# Patient Record
Sex: FEMALE | Race: WHITE | Employment: FULL TIME | URBAN - NONMETROPOLITAN AREA
[De-identification: names, ages, dates, MRNs, and addresses within clinical notes are randomized per-mention and may not be internally consistent; named-entity substitution may affect disease eponyms.]

---

## 2020-02-19 ENCOUNTER — OFFICE VISIT (OUTPATIENT)
Dept: ENT CLINIC | Age: 60
End: 2020-02-19
Payer: COMMERCIAL

## 2020-02-19 VITALS
WEIGHT: 140.9 LBS | BODY MASS INDEX: 24.05 KG/M2 | DIASTOLIC BLOOD PRESSURE: 74 MMHG | RESPIRATION RATE: 16 BRPM | SYSTOLIC BLOOD PRESSURE: 108 MMHG | HEART RATE: 80 BPM | HEIGHT: 64 IN

## 2020-02-19 PROCEDURE — 99203 OFFICE O/P NEW LOW 30 MIN: CPT | Performed by: NURSE PRACTITIONER

## 2020-02-19 PROCEDURE — 3017F COLORECTAL CA SCREEN DOC REV: CPT | Performed by: NURSE PRACTITIONER

## 2020-02-19 PROCEDURE — G8484 FLU IMMUNIZE NO ADMIN: HCPCS | Performed by: NURSE PRACTITIONER

## 2020-02-19 PROCEDURE — G8420 CALC BMI NORM PARAMETERS: HCPCS | Performed by: NURSE PRACTITIONER

## 2020-02-19 PROCEDURE — 1036F TOBACCO NON-USER: CPT | Performed by: NURSE PRACTITIONER

## 2020-02-19 PROCEDURE — G8427 DOCREV CUR MEDS BY ELIG CLIN: HCPCS | Performed by: NURSE PRACTITIONER

## 2020-02-19 RX ORDER — FLUTICASONE PROPIONATE 50 MCG
1 SPRAY, SUSPENSION (ML) NASAL DAILY
COMMUNITY
End: 2020-02-28 | Stop reason: SDUPTHER

## 2020-02-19 NOTE — PROGRESS NOTES
impaired. Vitals:    02/19/20 1351   BP: 108/74   Pulse: 80   Resp: 16       Head is normocephalic, no obvious masses or lesions. JEAN-CLAUDE, EOM full. Conjunctivae pink, moist, no discharge. External ears are normal: no scars, lesions or masses. R External auditory canal clear and free of any pathology  L External auditory canal clear and free of any pathology   Tympanic membranes:  R intact, translucent                                            L intact, translucent    Nasal bones: intact  Septum:  mild deviation to left anteriorly  Mucosa:  congested  Turbinates: congested   Discharge:  yellow/green-culture taken    Lips, tongue and oral cavity show tongue is midline, mobile, no lesions. Dentition: good, no malocclusion  Oral mucosa: moist  Tonsils: absent  Oropharynx: normal-appearing mucosa  Hard and soft palates symmetrical and intact. Uvula midline. Gag reflex present  Nasopharynx: not seen    No facial redness, swelling or tenderness. Salivary glands not enlarged. Neck symmetrical, supple  Cervical adenopathy: no palpable lymphadenopathy  Trachea midline  Thyroid not enlarged, no palpable nodules or masses, no tenderness    Chest equal and symmetrical expansion, no retractions    Extremities: no clubbing, cyanosis or edema  Gait steady  Skin: normal exposed surfaces  Cranial nerves grossly intact    Data:  All of the past medical history, past surgical history, family history, social history, allergies and current medications were reviewed. Assessment & Plan:        1. Chronic ethmoidal sinusitis    2. Chronic maxillary sinusitis, left    3. Chronic sphenoidal sinusitis, left    4. Ear fullness, bilateral    5. Nasal congestion    6. Facial pressure    7. Post-nasal drainage    8. Purulent rhinorrhea    Sohail Briones is a 63 y.o. female with 4 month history of sinus symptoms and ear fullness despite medical therapies. CT sinus was completed after 1 month of Omnicef.   The patient

## 2020-02-24 ENCOUNTER — HOSPITAL ENCOUNTER (OUTPATIENT)
Dept: CT IMAGING | Age: 60
Discharge: HOME | End: 2020-02-24

## 2020-02-24 LAB
AEROBIC CULTURE: NORMAL
ANAEROBIC CULTURE: NORMAL
GRAM STAIN RESULT: NORMAL

## 2020-02-24 PROCEDURE — 3209999900 CT COMPARISON OF OUTSIDE FILMS

## 2020-02-26 ENCOUNTER — TELEPHONE (OUTPATIENT)
Dept: ENT CLINIC | Age: 60
End: 2020-02-26

## 2020-02-27 NOTE — TELEPHONE ENCOUNTER
Patient returned your call. Patient states she will be in a meeting but has her phone with her and should be able to answer. Please call.

## 2020-02-28 ENCOUNTER — OFFICE VISIT (OUTPATIENT)
Dept: ENT CLINIC | Age: 60
End: 2020-02-28
Payer: COMMERCIAL

## 2020-02-28 VITALS
SYSTOLIC BLOOD PRESSURE: 102 MMHG | WEIGHT: 142.5 LBS | DIASTOLIC BLOOD PRESSURE: 64 MMHG | HEART RATE: 60 BPM | HEIGHT: 64 IN | RESPIRATION RATE: 14 BRPM | BODY MASS INDEX: 24.33 KG/M2

## 2020-02-28 PROCEDURE — G8420 CALC BMI NORM PARAMETERS: HCPCS | Performed by: OTOLARYNGOLOGY

## 2020-02-28 PROCEDURE — 1036F TOBACCO NON-USER: CPT | Performed by: OTOLARYNGOLOGY

## 2020-02-28 PROCEDURE — 99213 OFFICE O/P EST LOW 20 MIN: CPT | Performed by: OTOLARYNGOLOGY

## 2020-02-28 PROCEDURE — G8427 DOCREV CUR MEDS BY ELIG CLIN: HCPCS | Performed by: OTOLARYNGOLOGY

## 2020-02-28 PROCEDURE — G8484 FLU IMMUNIZE NO ADMIN: HCPCS | Performed by: OTOLARYNGOLOGY

## 2020-02-28 PROCEDURE — 3017F COLORECTAL CA SCREEN DOC REV: CPT | Performed by: OTOLARYNGOLOGY

## 2020-02-28 RX ORDER — AMOXICILLIN AND CLAVULANATE POTASSIUM 875; 125 MG/1; MG/1
1 TABLET, FILM COATED ORAL 2 TIMES DAILY
Qty: 56 TABLET | Refills: 0 | Status: SHIPPED | OUTPATIENT
Start: 2020-02-28 | End: 2020-03-27

## 2020-02-28 RX ORDER — FLUTICASONE PROPIONATE 50 MCG
1 SPRAY, SUSPENSION (ML) NASAL DAILY
Qty: 1 BOTTLE | Refills: 3 | Status: ON HOLD | OUTPATIENT
Start: 2020-02-28 | End: 2020-07-08 | Stop reason: HOSPADM

## 2020-02-28 ASSESSMENT — ENCOUNTER SYMPTOMS
COLOR CHANGE: 0
CHOKING: 0
VOICE CHANGE: 0
FACIAL SWELLING: 0
SORE THROAT: 0
RHINORRHEA: 0
DIARRHEA: 0
SHORTNESS OF BREATH: 0
VOMITING: 0
NAUSEA: 0
COUGH: 0
APNEA: 0
ABDOMINAL PAIN: 0
STRIDOR: 0
CHEST TIGHTNESS: 0
TROUBLE SWALLOWING: 0
SINUS PRESSURE: 0
WHEEZING: 0

## 2020-02-28 NOTE — PROGRESS NOTES
[facility] PROFESSIONAL SERVS  [facility] EAR, NOSE AND THROAT  [facility]  Dept: [phone]  Dept Fax: [phone]  Loc: [phone]    Russel Sanchez is a 63 y.o. female who was referred byNo ref. provider found for:  Chief Complaint   Patient presents with    Pre-op Exam     Patient is here for pre-op sinus surgery   . HPI:     Mulu Banegas is a 63 y.o. female who presents today for evaluation of her persistent sinusitis. She has been having rather pronounced headaches from sinusitis documented by CT scan now for several months. She has had multiple antibiotics including an entire month of Cefdinir. She had a about a week of Augmentin which helped only transiently. Zithromax did not help at all. She has had prednisone and even a few days of Afrin as well. She has persistent rather pronounced midface pain. CT following the Cefdinir showed some sinusitis in all 8 sinuses with opacification of the ostiomeatal complexes bilaterally. There were infraorbital ethmoid cells contributing to this obstruction. Fortunately, this was not a 1 mm thin slice CT compatible with the stereotactic computerized image guidance, and came from a different hospital.    She also has idiopathic pulmonary fibrosis and is very concerned regarding whether this is a similar process in her sinuses. History: Allergies   Allergen Reactions    Bactrim [Sulfamethoxazole-Trimethoprim] Hives    Sulfa Antibiotics Hives     Current Outpatient Medications   Medication Sig Dispense Refill    Nintedanib Esylate 150 MG CAPS Take by mouth      fluticasone (FLONASE) 50 MCG/ACT nasal spray 1 spray by Each Nostril route daily       No current facility-administered medications for this visit.       Past Medical History:   Diagnosis Date    Chronic idiopathic pulmonary fibrosis ([facility] [address]       Past Surgical History:   Procedure Laterality Date    TONSILLECTOMY AND ADENOIDECTOMY       History

## 2020-03-02 LAB
AEROBIC CULTURE: ABNORMAL
AEROBIC CULTURE: ABNORMAL
GRAM STAIN RESULT: ABNORMAL
ORGANISM: ABNORMAL

## 2020-03-06 ENCOUNTER — TELEPHONE (OUTPATIENT)
Dept: ENT CLINIC | Age: 60
End: 2020-03-06

## 2020-03-06 NOTE — TELEPHONE ENCOUNTER
The culture grew strep pneumoniae. The Augmentin should work just fine for that type of bacteria. Continue the current regimen.

## 2020-03-06 NOTE — TELEPHONE ENCOUNTER
Patient called to get the results of the culture that was taken sent to her Pulmonologist.  His name is Dr. Jed Conteh from Pulmonary and [address] at [facility]. The fax number is [phone]. She stated she was also given the results as well. I faxed the results to her Pulmonologist.  She also stated that she wanted to have all her visit notes sent to him as she has several issues. She stated that at her last visit when she checked in they said this doctor was not in the system and would have to check with the Manager. Rae Reno know and she will look into it as well.

## 2020-03-30 ENCOUNTER — HOSPITAL ENCOUNTER (OUTPATIENT)
Dept: CT IMAGING | Age: 60
Discharge: HOME | End: 2020-03-30
Payer: COMMERCIAL

## 2020-03-30 PROCEDURE — 70486 CT MAXILLOFACIAL W/O DYE: CPT

## 2020-04-06 ENCOUNTER — TELEPHONE (OUTPATIENT)
Dept: ENT CLINIC | Age: 60
End: 2020-04-06

## 2020-04-06 RX ORDER — AMOXICILLIN AND CLAVULANATE POTASSIUM 875; 125 MG/1; MG/1
1 TABLET, FILM COATED ORAL 2 TIMES DAILY
Qty: 28 TABLET | Refills: 0 | Status: SHIPPED | OUTPATIENT
Start: 2020-04-06 | End: 2020-04-20

## 2020-04-06 NOTE — TELEPHONE ENCOUNTER
I spoke with Dr. Jamse Lesch discussing the results. He states that she will likely need sinus surgery in the future. He agrees that there is fluid in her ear. He recommended another course of antibiotics since her left ear has improved. I called the patient to discuss. I explained that she does have persistent sinus disease and would likely benefit from surgery. I explained that given the circumstances of the COVID-19 outbreak, we are unable to schedule elective procedures at this time. She expressed understanding. We then discussed her ear. She says her left ear has improved, but her right one has actually worsened. She states that her ear feels full with muffled hearing and intermittent stabbing pains. I explained Dr. Jamse Lesch recommendation of an additional course of antibiotics. She states that she was on a month of Omnicef prior to seeing [place] and then another month of Augmentin after seeing Dr. Jamse Lesch shortly after that. I explained that her pain may mean an infection, especially if her daughter saw \"mustard yellow\" appearance of the TM which the antibiotic should help. I explained that we will do 14 days of Augmentin and have her follow up with Dr. Jamse Lesch in about 21 days for ear exam under microscope. If the fluid persists, DR. Jamse Lesch will likely place a tube in the office that day. The patient expressed understanding of the plan and thanked me. She will contact the office with new or worsening symptoms in the meantime. Please call the patient and schedule her for about a 3 week follow up with Dr. Jamse Lesch.

## 2020-04-28 ENCOUNTER — OFFICE VISIT (OUTPATIENT)
Dept: ENT CLINIC | Age: 60
End: 2020-04-28
Payer: COMMERCIAL

## 2020-04-28 VITALS
SYSTOLIC BLOOD PRESSURE: 114 MMHG | TEMPERATURE: 98.2 F | HEIGHT: 64 IN | WEIGHT: 134.6 LBS | HEART RATE: 80 BPM | RESPIRATION RATE: 12 BRPM | BODY MASS INDEX: 22.98 KG/M2 | DIASTOLIC BLOOD PRESSURE: 72 MMHG

## 2020-04-28 DIAGNOSIS — J32.2 CHRONIC ETHMOIDAL SINUSITIS: ICD-10-CM

## 2020-04-28 PROBLEM — J32.0 CHRONIC MAXILLARY SINUSITIS: Status: ACTIVE | Noted: 2020-04-28

## 2020-04-28 PROBLEM — J32.1 CHRONIC FRONTAL SINUSITIS: Status: ACTIVE | Noted: 2020-04-28

## 2020-04-28 PROBLEM — J32.3 CHRONIC SPHENOIDAL SINUSITIS: Status: ACTIVE | Noted: 2020-04-28

## 2020-04-28 PROCEDURE — G8427 DOCREV CUR MEDS BY ELIG CLIN: HCPCS | Performed by: OTOLARYNGOLOGY

## 2020-04-28 PROCEDURE — 3017F COLORECTAL CA SCREEN DOC REV: CPT | Performed by: OTOLARYNGOLOGY

## 2020-04-28 PROCEDURE — 69433 CREATE EARDRUM OPENING: CPT | Performed by: OTOLARYNGOLOGY

## 2020-04-28 PROCEDURE — 1036F TOBACCO NON-USER: CPT | Performed by: OTOLARYNGOLOGY

## 2020-04-28 PROCEDURE — G8420 CALC BMI NORM PARAMETERS: HCPCS | Performed by: OTOLARYNGOLOGY

## 2020-04-28 PROCEDURE — 99214 OFFICE O/P EST MOD 30 MIN: CPT | Performed by: OTOLARYNGOLOGY

## 2020-04-28 ASSESSMENT — ENCOUNTER SYMPTOMS
COLOR CHANGE: 0
NAUSEA: 0
SHORTNESS OF BREATH: 0
TROUBLE SWALLOWING: 0
DIARRHEA: 0
CHOKING: 0
RHINORRHEA: 0
VOICE CHANGE: 0
STRIDOR: 0
SINUS PRESSURE: 1
COUGH: 0
VOMITING: 0
SORE THROAT: 0
WHEEZING: 0
SINUS PAIN: 0
CHEST TIGHTNESS: 0
ABDOMINAL PAIN: 0
FACIAL SWELLING: 0
APNEA: 0

## 2020-04-28 NOTE — PROGRESS NOTES
[facility] PROFESSIONAL SERVS  [facility] EAR, NOSE AND THROAT  [facility]  Dept: [phone]  Dept Fax: [phone]  Loc: [phone]    Geovany Sanchez is a 63 y.o. female who was referred byNo ref. provider found for:  Chief Complaint   Patient presents with    Follow-up     Patient is here for a follow up after CT sinus 3/30/2020 c/o sinus pressure, sinus drainage and ear fullness    . HPI:     Filiberto Banks is a 63 y.o. female who presents today for follow-up on her CT results after month of vigorous therapy for her sinusitis on Augmentin 875 twice daily sinus irrigations. Her ears have gotten worse. She is now plugged up in both ears. CT scan showed effusion in the right middle ear with partial opacification of the mastoids. She also had significant mucosal disease in all 8 sinuses. Septum is relatively straight turbinates did not seem hypertrophied and she is not complaining of nasal obstruction. She is complaining of pressure in her face that has persisted now for months. Prior culture grew Streptococcus pneumonia, quite sensitive, and yet she did not improve. .    Reviewing her CT and normal imaging, in view of her sinus symptoms, she also requests we proceed with surgical intervention. She complains of severe difficulty hearing. History: Allergies   Allergen Reactions    Bactrim [Sulfamethoxazole-Trimethoprim] Hives    Sulfa Antibiotics Hives     Current Outpatient Medications   Medication Sig Dispense Refill    fluticasone (FLONASE) 50 MCG/ACT nasal spray 1 spray by Nasal route daily 1 Bottle 3    Nintedanib Esylate 150 MG CAPS Take by mouth       No current facility-administered medications for this visit. Past Medical History:   Diagnosis Date    Chronic idiopathic pulmonary fibrosis ([facility] [address]       Past Surgical History:   Procedure Laterality Date    TONSILLECTOMY AND ADENOIDECTOMY       History reviewed.  No pertinent family and atraumatic. No laceration. Comments:        Right Ear: Ear canal and external ear normal. No drainage or swelling. A middle ear effusion is present. Tympanic membrane is not perforated or erythematous. Left Ear: Ear canal and external ear normal. No drainage or swelling. A middle ear effusion is present. Tympanic membrane is not perforated or erythematous. Nose: Nose normal. No septal deviation, mucosal edema or rhinorrhea. Mouth/Throat:      Mouth: Mucous membranes are not pale and not dry. No oral lesions. Pharynx: Oropharynx is clear. Uvula midline. No oropharyngeal exudate or posterior oropharyngeal erythema. Comments: LIps: lips normal     Mallampati 1  Base of tongue: symmetric,  Eyes:      Extraocular Movements:      Right eye: Normal extraocular motion and no nystagmus. Left eye: Normal extraocular motion and no nystagmus. Comments: Conjugate gaze   Neck:      Musculoskeletal: Neck supple. Thyroid: No thyroid mass or thyromegaly. Trachea: Phonation normal. No tracheal deviation. Comments:   Salivary glands not enlarged and normal to palpation    Pulmonary:      Effort: Pulmonary effort is normal. No retractions. Breath sounds: No stridor. Neurological:      Mental Status: She is alert and oriented to person, place, and time. Cranial Nerves: No cranial nerve deficit (VIIth N function intact bilat). Psychiatric:         Mood and Affect: Mood and affect normal.         Behavior: Behavior is cooperative. Data:  All of the past medical history, past surgical history, family history,social history, allergies and current medications were reviewed with the patient. Assessment & Plan   Diagnoses and all orders for this visit:     Diagnosis Orders   1. Chronic frontal sinusitis  IGS Endo Sinus Sx    OR NASAL/SINUS NDSC W/RMVL TISS FROM FRONTAL SINUS   2.  Chronic ethmoidal sinusitis  IGS Endo Sinus Sx    OR NASAL/SINUS NDSC W/TOTAL ETHOIDECTOMY    Culture, Nasal   3. Chronic maxillary sinusitis, left  IGS Endo Sinus Sx    IA NASAL SCOPY,OPEN MAXILL SINUS   4. Chronic sphenoidal sinusitis, left  IGS Endo Sinus Sx    IA NASAL SCOPY,SPHENOIDOTOMY   5. Bilateral chronic serous otitis media  IA CREATE EARDRUM OPENING,LOCAL ANESTH       The findings were explained and her questions were answered. Her otitis actually got worse on culture directed antibiotic therapy for over a month. She requests tube placements. Myringotomy and tympanostomy tube placement, bilateral    After an adequate level of topical anesthesia of the posterior inferior quadrant of each tympanic membrane had been achieved with phenol, alcohol was instilled and suctioned dry. A radial incision was made in the posterior inferior quadrant. Middle ear fluid was suctioned. Fischer ventilation tube was placed without difficulty. The patient's hearing improved immediately. The opposite ear was treated in a similar fashion with tube placement. The patient tolerated the procedure well. [place] had a slightly yellowish serous effusion. Recommended surgical procedures are:   Bilateral endoscopic intranasal frontal sinusotomies  Bilateral complete endoscopic ethmoidectomies  Bilateral endoscopic maxillary antrostomies  Bilateral endoscopic sphenoidostomies  Stereotactic computerized image guidance for sinus surgery  Time estimate:  3 hours    INFORMED CONSENT:   Using the CT scan, the planned procedures were explained in detail. The risks and benefits of these sinus procedures, including but not limited to risk of anesthesia, bleeding, infection, vision loss and /or eye muscle damage, CSF leak, epiphora (eye watering), potential need for further surgery and possible persistent sinus infections were discussed with the patient.  The risks and benefits of alternative procedures, as well as the possible consequences of not undergoing the procedures were discussed, if

## 2020-04-30 LAB — THROAT/NOSE CULTURE: NORMAL

## 2020-05-07 ENCOUNTER — TELEPHONE (OUTPATIENT)
Dept: ENT CLINIC | Age: 60
End: 2020-05-07

## 2020-05-07 ENCOUNTER — PATIENT MESSAGE (OUTPATIENT)
Dept: ENT CLINIC | Age: 60
End: 2020-05-07

## 2020-06-09 ENCOUNTER — OFFICE VISIT (OUTPATIENT)
Dept: ENT CLINIC | Age: 60
End: 2020-06-09
Payer: COMMERCIAL

## 2020-06-09 VITALS
SYSTOLIC BLOOD PRESSURE: 114 MMHG | DIASTOLIC BLOOD PRESSURE: 68 MMHG | RESPIRATION RATE: 14 BRPM | HEART RATE: 62 BPM | BODY MASS INDEX: 22.14 KG/M2 | WEIGHT: 129 LBS | TEMPERATURE: 97.3 F

## 2020-06-09 PROBLEM — D86.0 SARCOIDOSIS OF LUNG (HCC): Status: ACTIVE | Noted: 2020-06-09

## 2020-06-09 PROCEDURE — G8420 CALC BMI NORM PARAMETERS: HCPCS | Performed by: OTOLARYNGOLOGY

## 2020-06-09 PROCEDURE — 99214 OFFICE O/P EST MOD 30 MIN: CPT | Performed by: OTOLARYNGOLOGY

## 2020-06-09 PROCEDURE — 3017F COLORECTAL CA SCREEN DOC REV: CPT | Performed by: OTOLARYNGOLOGY

## 2020-06-09 PROCEDURE — G8427 DOCREV CUR MEDS BY ELIG CLIN: HCPCS | Performed by: OTOLARYNGOLOGY

## 2020-06-09 PROCEDURE — 1036F TOBACCO NON-USER: CPT | Performed by: OTOLARYNGOLOGY

## 2020-06-09 ASSESSMENT — ENCOUNTER SYMPTOMS
VOICE CHANGE: 0
VOMITING: 0
SHORTNESS OF BREATH: 0
APNEA: 0
STRIDOR: 0
NAUSEA: 0
TROUBLE SWALLOWING: 0
ABDOMINAL PAIN: 0
DIARRHEA: 0
FACIAL SWELLING: 0
CHEST TIGHTNESS: 0
CHOKING: 0
COLOR CHANGE: 0
RHINORRHEA: 0
SINUS PRESSURE: 1
COUGH: 0
WHEEZING: 0
SORE THROAT: 0

## 2020-06-09 NOTE — LETTER
explained in detail. The risks and benefits of these sinus procedures, including but not limited to risk of anesthesia, bleeding, infection, vision loss and /or eye muscle damage, CSF leak, epiphora (eye watering), potential need for further surgery and possible persistent sinus infections were discussed with the patient. The risks and benefits of alternative procedures, as well as the possible consequences of not undergoing the procedures were discussed, if applicable. They read and kept the information sheet with postop instructions, which lists the more common and even some of the more unusual complications, and the sheet listing the types of medications to avoid that could interfere with clotting. All of their questions were answered and they understood no guarantees were made. The patient verbalized understanding and gave consent to proceed. They also specifically consent to any additional related procedure, if the indications and need become evident during the surgery. We will check with Dr Kalyan Padilla and the patient. Last note from Dr Kalyan Padilla says to continue the med and suggested a second opinion. There is no question about the medical indications for the surgery:      Most recent scan following a month of vigorous therapy for sinusitis. Final decision for surgery is still pending clearance from Dr Kalyan Padilla      If you have questions, please do not hesitate to call me. I look forward to following Carleen Mireles along with you.     Sincerely,          García Guardado MD

## 2020-06-09 NOTE — PROGRESS NOTES
[facility] PROFESSIONAL SERVS  [facility] EAR, NOSE AND THROAT  [address]  Dept: [phone]  Dept Fax: [phone]  Loc: [phone]    Ida Sanchez is a 63 y.o. female who was referred byNo ref. provider found for:  Chief Complaint   Patient presents with    Pre-op Exam     Here for pre-op IGS/sinus 7/8/2020. 3 week tube check rescheduled drom 5/192020 due to COVID-19. Lauree Grade HPI:     Yeyo Waterman is a 63 y.o. female who presents today for potential preop visit regarding her sinuses. We reviewed her CT results after a month of vigorous therapy for her sinusitis on Augmentin 875 twice daily sinus irrigations. Her ears have gotten worse. She is now plugged up in both ears. CT scan showed effusion in the right middle ear with partial opacification of the mastoids. She also had significant mucosal disease in all 8 sinuses. Septum is relatively straight turbinates did not seem hypertrophied and she is not complaining of nasal obstruction. She is complaining of pressure in her face that has persisted now for months. Prior culture grew Streptococcus pneumonia, quite sensitive, and yet she did not improve. .. History: Allergies   Allergen Reactions    Bactrim [Sulfamethoxazole-Trimethoprim] Hives    Sulfa Antibiotics Hives     Current Outpatient Medications   Medication Sig Dispense Refill    fluticasone (FLONASE) 50 MCG/ACT nasal spray 1 spray by Nasal route daily 1 Bottle 3    Nintedanib Esylate 150 MG CAPS Take by mouth       No current facility-administered medications for this visit. Past Medical History:   Diagnosis Date    Chronic idiopathic pulmonary fibrosis ([facility] [address]       Past Surgical History:   Procedure Laterality Date    TONSILLECTOMY AND ADENOIDECTOMY       History reviewed. No pertinent family history.   Social History     Tobacco Use    Smoking status: Never Smoker    Smokeless tobacco: Never Used   Substance Use Topics    tube is present. Tympanic membrane is not perforated or erythematous. Left Ear: Hearing, tympanic membrane, ear canal and external ear normal. No drainage or swelling. No middle ear effusion. A PE tube is present. Tympanic membrane is not perforated or erythematous. Nose: Mucosal edema and rhinorrhea present. No septal deviation. Mouth/Throat:      Mouth: Mucous membranes are not pale and not dry. No oral lesions. Pharynx: Oropharynx is clear. Uvula midline. No oropharyngeal exudate or posterior oropharyngeal erythema. Comments: LIps: lips normal     Mallampati 1  Base of tongue: symmetric,  Eyes:      Extraocular Movements:      Right eye: Normal extraocular motion and no nystagmus. Left eye: Normal extraocular motion and no nystagmus. Comments: Conjugate gaze   Neck:      Musculoskeletal: Neck supple. Thyroid: No thyroid mass or thyromegaly. Trachea: Phonation normal. No tracheal deviation. Comments:   Salivary glands not enlarged and normal to palpation    Pulmonary:      Effort: Pulmonary effort is normal. No retractions. Breath sounds: No stridor. Neurological:      Mental Status: She is alert and oriented to person, place, and time. Cranial Nerves: No cranial nerve deficit (VIIth N function intact bilat). Psychiatric:         Mood and Affect: Mood and affect normal.         Behavior: Behavior is cooperative. Data:  All of the past medical history, past surgical history, family history,social history, allergies and current medications were reviewed with the patient. Assessment & Plan   Diagnoses and all orders for this visit:     Diagnosis Orders   1. Chronic frontal sinusitis     2. Chronic ethmoidal sinusitis     3. Chronic maxillary sinusitis, left     4. Chronic sphenoidal sinusitis, left     5.  Sarcoidosis of lung ([facility] [address]     6. Status post myringotomy with tube placement of both ears         The findings were explained and her questions were answered. Recommended surgical procedures are:   Bilateral endoscopic intranasal frontal sinusotomies  Bilateral complete endoscopic ethmoidectomies  Bilateral endoscopic maxillary antrostomies  Bilateral endoscopic sphenoidostomies  Stereotactic computerized image guidance for sinus surgery  Time estimate: 3 hours        The patient still needs pulmonary clearance. Have not received anything in terms of whether to stop the medication she is on for healing purposes, since it blocks blocks fibroblast function. INFORMED CONSENT:   Using the CT scan, the planned procedures were explained in detail. The risks and benefits of these sinus procedures, including but not limited to risk of anesthesia, bleeding, infection, vision loss and /or eye muscle damage, CSF leak, epiphora (eye watering), potential need for further surgery and possible persistent sinus infections were discussed with the patient. The risks and benefits of alternative procedures, as well as the possible consequences of not undergoing the procedures were discussed, if applicable. They read and kept the information sheet with postop instructions, which lists the more common and even some of the more unusual complications, and the sheet listing the types of medications to avoid that could interfere with clotting. All of their questions were answered and they understood no guarantees were made. The patient verbalized understanding and gave consent to proceed. They also specifically consent to any additional related procedure, if the indications and need become evident during the surgery. We will check with Dr Fabienne Driscoll and the patient. Last note from Dr Fabienne Driscoll says to continue the med and suggested a second opinion. There is no question about the medical indications for the surgery:        Most recent scan following a month of vigorous therapy for sinusitis.     Final decision for surgery is still pending clearance from Dr Fabienne Driscoll Dilshad Rawls. Maurice Turner MD    **This report has been created using voice recognition software. It may contain minor errors which are inherent in voicerecognition technology. **

## 2020-06-10 ENCOUNTER — TELEPHONE (OUTPATIENT)
Dept: ENT CLINIC | Age: 60
End: 2020-06-10

## 2020-06-10 NOTE — TELEPHONE ENCOUNTER
Patient called and asked to have her COVID-19 and CBC pre op testing orders faxed to [facility] Scheduling so she can be scheduled to get the testing before surgery. I have faxed the orders to [phone].

## 2020-06-30 NOTE — TELEPHONE ENCOUNTER
The patient called the office wandering about pre op clearance. The patient has an appointment this afternoon with her family doctor, Jonh Corona CNP. The patient would like to know why she needed preop clearance from her pulmonologist rather than her family doctor. The patient stated that her pulmonologist in [facility] cleared her for surgery and sent the paper to our office. I did not see that form. The patient was wondering if she could receive clearance from her family doctor and if we could fax any necessary paperwork; she stated that the appointment today with her family doctor felt unnecessary.

## 2020-07-02 ENCOUNTER — TELEPHONE (OUTPATIENT)
Dept: ENT CLINIC | Age: 60
End: 2020-07-02

## 2020-07-02 RX ORDER — UBIDECARENONE 30 MG
1 CAPSULE ORAL DAILY
COMMUNITY

## 2020-07-02 RX ORDER — UBIDECARENONE 50 MG
1 CAPSULE ORAL DAILY
COMMUNITY

## 2020-07-02 RX ORDER — PHENOL 1.4 %
1 AEROSOL, SPRAY (ML) MUCOUS MEMBRANE DAILY
COMMUNITY

## 2020-07-02 RX ORDER — ASCORBIC ACID 500 MG
500 TABLET ORAL DAILY
COMMUNITY

## 2020-07-02 NOTE — PROGRESS NOTES
Following instructions given to patient, who states understanding:    NPO after midnight  [employer] and 's license  Wear comfortable clean clothing  Do not bring jewelry   Shower night before and morning of surgery with a liquid antibacterial soap  Bring medications in original bottles  Follow all instructions given by your physician   needed at discharge  Call [phone] for any questions  Report to [facility] on 2nd floor  If you would become ill prior to surgery, please call the surgeon  May have only 1 visitor accompany you for surgery  Please bring and wear mask    Covid screening questionnaire complete and negative for symptoms or exposure see chart for documentation    Patient will get covid test at [facility] on Mon am 7/6/20.

## 2020-07-02 NOTE — TELEPHONE ENCOUNTER
I spoke with Mariah at Dr. Aj Soto office to verify if patient could stop her Nintedanib Esylate 150 mg before surgery (IGS Sinus Surgery) and if it would interfere with the healing process. Per [employer] after checking with the pulmonologist patient can continue medication prior to surgery and it will not interfere with the healing process.

## 2020-07-02 NOTE — PROGRESS NOTES
In preparation for their surgical procedure above patient was screened for Obstructive Sleep Apnea (ANALIA) using the STOP-Bang Questionnaire by the Pre-Admission Testing department. This is a pre-surgical screening tool for patient safety and serves as a recommendation, this WILL NOT cause cancellation of surgery. STOP-Bang Questionnaire  * Do you currently see a pulmonologist?  No     If yes STOP, do not complete. Patient follows with Dr.     1.  Do you snore loudly (able to be heard in the next room)? No    2. Do you often feel tired or sleepy during the daytime? No       3. Has anyone ever told you that you stop breathing during your sleep? No    4. Do you have or are you being treated for high blood pressure? No      5. BMI more than 35? BMI (Calculated): 22.2        No    6. Age over 48 years? 63 y.o. Yes    7. Neck Circumference greater than 17 inches for male or 16 inches for female? Measured           (visits only)            Not Applicable    8. Gender Male? No      TOTAL SCORE: 1    ANALIA - Low Risk : Yes to 0 - 2 questions  ANALIA - Intermediate Risk : Yes to 3 - 4 questions  ANALIA - High Risk : Yes to 5 - 8 questions    Adapted from:   STOP Questionnaire: A Tool to Screen Patients for Obstructive Sleep Apnea   DEL Slade.PBridgetteC., Medardo Baer M.B.B.S., Matty Babcock M.D., Sanket Stein. Monie Ellis, Ph.D., Karyna Rose M.B.B.S., Heriberto Vargas M.Sc., Berta Hollis M.D., Hank Bowman. DEL Koo[employer]    Anesthesiology 2008; 589:365-10 Copyright 2008, the [address] of [employer]est[employer], stephan 37.   ----------------------------------------------------------------------------------------------------------------

## 2020-07-07 NOTE — H&P
SRPX [facility] PROFESSIONAL SERVS  [facility]'S EAR, NOSE AND THROAT  [facility]  Dept: [phone]  Dept Fax: [phone]  Loc: [phone]     Ny Sanchez is a 63 y.o. female who was referred byNo ref. provider found for:       Chief Complaint   Patient presents with    Pre-op Exam       Here for pre-op IGS/sinus 7/8/2020. 3 week tube check rescheduled drom 5/192020 due to COVID-19. .     HPI:      Meghan Ordonez is a 63 y.o. female who presents today for potential preop visit regarding her sinuses. We reviewed her CT results after a month of vigorous therapy for her sinusitis on Augmentin 875 twice daily sinus irrigations.  Her ears have gotten worse.  She is now plugged up in both ears.  CT scan showed effusion in the right middle ear with partial opacification of the mastoids.  She also had significant mucosal disease in all 8 sinuses.  Septum is relatively straight turbinates did not seem hypertrophied and she is not complaining of nasal obstruction.  She is complaining of pressure in her face that has persisted now for months.  Prior culture grew Streptococcus pneumonia, quite sensitive, and yet she did not improve. ..     History:      Allergies   Allergen Reactions    Bactrim [Sulfamethoxazole-Trimethoprim] Hives    Sulfa Antibiotics Hives      Current Facility-Administered Medications          Current Outpatient Medications   Medication Sig Dispense Refill    fluticasone (FLONASE) 50 MCG/ACT nasal spray 1 spray by Nasal route daily 1 Bottle 3    Nintedanib Esylate 150 MG CAPS Take by mouth          No current facility-administered medications for this visit.          Past Medical History        Past Medical History:   Diagnosis Date    Chronic idiopathic pulmonary fibrosis (HCC)           Past Surgical History         Past Surgical History:   Procedure Laterality Date    TONSILLECTOMY AND ADENOIDECTOMY             Family History   History reviewed.  No pertinent family history. Social History           Tobacco Use    Smoking status: Never Smoker    Smokeless tobacco: Never Used   Substance Use Topics    Alcohol use: Not on file         Subjective:      Review of Systems   Constitutional: Negative for activity change, appetite change, chills, diaphoresis, fatigue, fever and unexpected weight change. HENT: Positive for congestion, postnasal drip and sinus pressure. Negative for dental problem, ear discharge, ear pain, facial swelling, hearing loss, mouth sores, nosebleeds, rhinorrhea, sneezing, sore throat, tinnitus, trouble swallowing and voice change. Eyes: Negative for visual disturbance. Respiratory: Negative for apnea, cough, choking, chest tightness, shortness of breath, wheezing and stridor. Cardiovascular: Negative for chest pain, palpitations and leg swelling. Gastrointestinal: Negative for abdominal pain, diarrhea, nausea and vomiting. Endocrine: Negative for cold intolerance, heat intolerance, polydipsia and polyuria. Genitourinary: Negative for dysuria, enuresis and hematuria. Musculoskeletal: Negative for arthralgias, gait problem, neck pain and neck stiffness. Skin: Negative for color change and rash. Allergic/Immunologic: Negative for environmental allergies, food allergies and immunocompromised state. Neurological: Negative for dizziness, syncope, facial asymmetry, speech difficulty, light-headedness and headaches. Hematological: Negative for adenopathy. Does not bruise/bleed easily. Psychiatric/Behavioral: Negative for confusion and sleep disturbance. The patient is not nervous/anxious.          Objective:   /68 (Site: Right Upper Arm, Position: Sitting)   Pulse 62   Temp 97.3 °F (36.3 °C)   Resp 14   Wt 129 lb (58.5 kg)   BMI 22.14 kg/m²      Physical Exam  Vitals signs and nursing note reviewed. Constitutional:       Appearance: She is well-developed. HENT:      Head: Normocephalic and atraumatic. No laceration. Comments:        Right Ear: Hearing, tympanic membrane, ear canal and external ear normal. No drainage or swelling. No middle ear effusion. A PE tube is present. Tympanic membrane is not perforated or erythematous. Left Ear: Hearing, tympanic membrane, ear canal and external ear normal. No drainage or swelling. No middle ear effusion. A PE tube is present. Tympanic membrane is not perforated or erythematous. Nose: Mucosal edema and rhinorrhea present. No septal deviation. Mouth/Throat:      Mouth: Mucous membranes are not pale and not dry. No oral lesions. Pharynx: Oropharynx is clear. Uvula midline. No oropharyngeal exudate or posterior oropharyngeal erythema. Comments: LIps: lips normal     Mallampati 1  Base of tongue: symmetric,  Eyes:      Extraocular Movements:      Right eye: Normal extraocular motion and no nystagmus. Left eye: Normal extraocular motion and no nystagmus. Comments: Conjugate gaze   Neck:      Musculoskeletal: Neck supple. Thyroid: No thyroid mass or thyromegaly. Trachea: Phonation normal. No tracheal deviation. Comments:   Salivary glands not enlarged and normal to palpation    Pulmonary:      Effort: Pulmonary effort is normal. No retractions. Breath sounds: No stridor. Neurological:      Mental Status: She is alert and oriented to person, place, and time. Cranial Nerves: No cranial nerve deficit (VIIth N function intact bilat). Psychiatric:         Mood and Affect: Mood and affect normal.         Behavior: Behavior is cooperative.            Data:  All of the past medical history, past surgical history, family history,social history, allergies and current medications were reviewed with the patient. Assessment & Plan   Diagnoses and all orders for this visit:       Diagnosis Orders   1. Chronic frontal sinusitis      2. Chronic ethmoidal sinusitis      3. Chronic maxillary sinusitis, left      4.  Chronic opinion.     There is no question about the medical indications for the surgery:          Most recent scan following a month of vigorous therapy for sinusitis.      Final decision for surgery had not been made at the time of this visit. We heard later from Dr. Eyad Weaver, pulmonology, that it was okay to proceed, and the medication would not affect healing, as documented in the chart and telephone notes. Since no septoplasty was involved, we will proceed. I also checked with the hospital pharmacy, and they could find no contraindications or warnings associated with that medication regarding surgical interventions. Luis Alberto Art. Beba Caldwell MD     **This report has been created using voice recognition software. It may contain minor errors which are inherent in voicerecognition technology. **

## 2020-07-07 NOTE — PROGRESS NOTES
Patient contacted regarding COVID-19 screen. Following questions asked: In the last month, have you been in contact with someone who was confirmed or suspected to have Coronavirus/COVID-19:  Patient stated NO    Do you or family members have any of the following symptoms:  Cough-no   Muscle pain-no   Shortness of breath-no   Fever-no   Weakness-no  Severe headache-no   Sore throat-no   Respiratory symptoms-no    Have you traveled internationally in the last month?  No     Have you been to the emergency room recently-no     Pt had covid test at [facility] 7/6/20

## 2020-07-08 ENCOUNTER — ANESTHESIA (OUTPATIENT)
Dept: OPERATING ROOM | Age: 60
End: 2020-07-08
Payer: COMMERCIAL

## 2020-07-08 ENCOUNTER — ANESTHESIA EVENT (OUTPATIENT)
Dept: OPERATING ROOM | Age: 60
End: 2020-07-08
Payer: COMMERCIAL

## 2020-07-08 ENCOUNTER — HOSPITAL ENCOUNTER (OUTPATIENT)
Age: 60
Setting detail: OUTPATIENT SURGERY
Discharge: HOME | End: 2020-07-08
Attending: OTOLARYNGOLOGY | Admitting: OTOLARYNGOLOGY
Payer: COMMERCIAL

## 2020-07-08 VITALS — DIASTOLIC BLOOD PRESSURE: 66 MMHG | TEMPERATURE: 99.3 F | OXYGEN SATURATION: 100 % | SYSTOLIC BLOOD PRESSURE: 111 MMHG

## 2020-07-08 VITALS
DIASTOLIC BLOOD PRESSURE: 80 MMHG | SYSTOLIC BLOOD PRESSURE: 147 MMHG | OXYGEN SATURATION: 98 % | BODY MASS INDEX: 22.26 KG/M2 | TEMPERATURE: 98.6 F | RESPIRATION RATE: 16 BRPM | HEART RATE: 106 BPM | WEIGHT: 130.4 LBS | HEIGHT: 64 IN

## 2020-07-08 LAB
EKG ATRIAL RATE: 74 BPM
EKG P AXIS: 64 DEGREES
EKG P-R INTERVAL: 180 MS
EKG Q-T INTERVAL: 424 MS
EKG QRS DURATION: 80 MS
EKG QTC CALCULATION (BAZETT): 470 MS
EKG R AXIS: 62 DEGREES
EKG T AXIS: 59 DEGREES
EKG VENTRICULAR RATE: 74 BPM

## 2020-07-08 PROCEDURE — C2625 STENT, NON-COR, TEM W/DEL SY: HCPCS | Performed by: OTOLARYNGOLOGY

## 2020-07-08 PROCEDURE — 88305 TISSUE EXAM BY PATHOLOGIST: CPT

## 2020-07-08 PROCEDURE — 31253 NSL/SINS NDSC TOTAL: CPT | Performed by: OTOLARYNGOLOGY

## 2020-07-08 PROCEDURE — 61782 SCAN PROC CRANIAL EXTRA: CPT | Performed by: OTOLARYNGOLOGY

## 2020-07-08 PROCEDURE — 31287 NASAL/SINUS ENDOSCOPY SURG: CPT | Performed by: OTOLARYNGOLOGY

## 2020-07-08 PROCEDURE — 2500000003 HC RX 250 WO HCPCS: Performed by: REGISTERED NURSE

## 2020-07-08 PROCEDURE — 6370000000 HC RX 637 (ALT 250 FOR IP): Performed by: OTOLARYNGOLOGY

## 2020-07-08 PROCEDURE — 2780000010 HC IMPLANT OTHER: Performed by: OTOLARYNGOLOGY

## 2020-07-08 PROCEDURE — 87070 CULTURE OTHR SPECIMN AEROBIC: CPT

## 2020-07-08 PROCEDURE — 93005 ELECTROCARDIOGRAM TRACING: CPT | Performed by: OTOLARYNGOLOGY

## 2020-07-08 PROCEDURE — 3700000001 HC ADD 15 MINUTES (ANESTHESIA): Performed by: OTOLARYNGOLOGY

## 2020-07-08 PROCEDURE — 87147 CULTURE TYPE IMMUNOLOGIC: CPT

## 2020-07-08 PROCEDURE — 6370000000 HC RX 637 (ALT 250 FOR IP): Performed by: REGISTERED NURSE

## 2020-07-08 PROCEDURE — 2580000003 HC RX 258: Performed by: REGISTERED NURSE

## 2020-07-08 PROCEDURE — 87075 CULTR BACTERIA EXCEPT BLOOD: CPT

## 2020-07-08 PROCEDURE — 2580000003 HC RX 258: Performed by: OTOLARYNGOLOGY

## 2020-07-08 PROCEDURE — 87205 SMEAR GRAM STAIN: CPT

## 2020-07-08 PROCEDURE — 31267 ENDOSCOPY MAXILLARY SINUS: CPT | Performed by: OTOLARYNGOLOGY

## 2020-07-08 PROCEDURE — 2709999900 HC NON-CHARGEABLE SUPPLY: Performed by: OTOLARYNGOLOGY

## 2020-07-08 PROCEDURE — 7100000011 HC PHASE II RECOVERY - ADDTL 15 MIN: Performed by: OTOLARYNGOLOGY

## 2020-07-08 PROCEDURE — 7100000001 HC PACU RECOVERY - ADDTL 15 MIN: Performed by: OTOLARYNGOLOGY

## 2020-07-08 PROCEDURE — 2720000010 HC SURG SUPPLY STERILE: Performed by: OTOLARYNGOLOGY

## 2020-07-08 PROCEDURE — 6360000002 HC RX W HCPCS: Performed by: OTOLARYNGOLOGY

## 2020-07-08 PROCEDURE — 7100000000 HC PACU RECOVERY - FIRST 15 MIN: Performed by: OTOLARYNGOLOGY

## 2020-07-08 PROCEDURE — 3600000014 HC SURGERY LEVEL 4 ADDTL 15MIN: Performed by: OTOLARYNGOLOGY

## 2020-07-08 PROCEDURE — 7100000010 HC PHASE II RECOVERY - FIRST 15 MIN: Performed by: OTOLARYNGOLOGY

## 2020-07-08 PROCEDURE — 6360000002 HC RX W HCPCS: Performed by: REGISTERED NURSE

## 2020-07-08 PROCEDURE — 3600000004 HC SURGERY LEVEL 4 BASE: Performed by: OTOLARYNGOLOGY

## 2020-07-08 PROCEDURE — 3700000000 HC ANESTHESIA ATTENDED CARE: Performed by: OTOLARYNGOLOGY

## 2020-07-08 PROCEDURE — 2500000003 HC RX 250 WO HCPCS: Performed by: OTOLARYNGOLOGY

## 2020-07-08 DEVICE — PROPEL MINI SINUS IMPLANT
Type: IMPLANTABLE DEVICE | Site: NOSE | Status: FUNCTIONAL
Brand: PROPEL MINI

## 2020-07-08 DEVICE — AGENT HEMOSTATIC SURGIFLOW MATRIX KIT W/THROMBIN: Type: IMPLANTABLE DEVICE | Site: NOSE | Status: FUNCTIONAL

## 2020-07-08 RX ORDER — FENTANYL CITRATE 50 UG/ML
INJECTION, SOLUTION INTRAMUSCULAR; INTRAVENOUS PRN
Status: DISCONTINUED | OUTPATIENT
Start: 2020-07-08 | End: 2020-07-08 | Stop reason: SDUPTHER

## 2020-07-08 RX ORDER — SODIUM CHLORIDE 9 MG/ML
INJECTION, SOLUTION INTRAVENOUS CONTINUOUS
Status: DISCONTINUED | OUTPATIENT
Start: 2020-07-08 | End: 2020-07-08 | Stop reason: HOSPADM

## 2020-07-08 RX ORDER — HYDROCODONE BITARTRATE AND ACETAMINOPHEN 7.5; 325 MG/1; MG/1
1 TABLET ORAL ONCE
Status: COMPLETED | OUTPATIENT
Start: 2020-07-08 | End: 2020-07-08

## 2020-07-08 RX ORDER — SODIUM CHLORIDE 9 MG/ML
INJECTION, SOLUTION INTRAVENOUS CONTINUOUS PRN
Status: DISCONTINUED | OUTPATIENT
Start: 2020-07-08 | End: 2020-07-08 | Stop reason: SDUPTHER

## 2020-07-08 RX ORDER — GLYCOPYRROLATE 1 MG/5 ML
SYRINGE (ML) INTRAVENOUS PRN
Status: DISCONTINUED | OUTPATIENT
Start: 2020-07-08 | End: 2020-07-08 | Stop reason: SDUPTHER

## 2020-07-08 RX ORDER — FENTANYL CITRATE 50 UG/ML
50 INJECTION, SOLUTION INTRAMUSCULAR; INTRAVENOUS EVERY 5 MIN PRN
Status: DISCONTINUED | OUTPATIENT
Start: 2020-07-08 | End: 2020-07-08 | Stop reason: HOSPADM

## 2020-07-08 RX ORDER — PREDNISONE 20 MG/1
20 TABLET ORAL DAILY
Qty: 4 TABLET | Refills: 0 | Status: SHIPPED | OUTPATIENT
Start: 2020-07-08 | End: 2020-07-11

## 2020-07-08 RX ORDER — FENTANYL CITRATE 50 UG/ML
25 INJECTION, SOLUTION INTRAMUSCULAR; INTRAVENOUS EVERY 5 MIN PRN
Status: DISCONTINUED | OUTPATIENT
Start: 2020-07-08 | End: 2020-07-08 | Stop reason: HOSPADM

## 2020-07-08 RX ORDER — NEOSTIGMINE METHYLSULFATE 5 MG/5 ML
SYRINGE (ML) INTRAVENOUS PRN
Status: DISCONTINUED | OUTPATIENT
Start: 2020-07-08 | End: 2020-07-08 | Stop reason: SDUPTHER

## 2020-07-08 RX ORDER — MEPERIDINE HYDROCHLORIDE 25 MG/ML
12.5 INJECTION INTRAMUSCULAR; INTRAVENOUS; SUBCUTANEOUS EVERY 5 MIN PRN
Status: DISCONTINUED | OUTPATIENT
Start: 2020-07-08 | End: 2020-07-08 | Stop reason: HOSPADM

## 2020-07-08 RX ORDER — DEXAMETHASONE SODIUM PHOSPHATE 4 MG/ML
INJECTION, SOLUTION INTRA-ARTICULAR; INTRALESIONAL; INTRAMUSCULAR; INTRAVENOUS; SOFT TISSUE PRN
Status: DISCONTINUED | OUTPATIENT
Start: 2020-07-08 | End: 2020-07-08 | Stop reason: SDUPTHER

## 2020-07-08 RX ORDER — SUCCINYLCHOLINE/SOD CL,ISO/PF 200MG/10ML
SYRINGE (ML) INTRAVENOUS PRN
Status: DISCONTINUED | OUTPATIENT
Start: 2020-07-08 | End: 2020-07-08 | Stop reason: SDUPTHER

## 2020-07-08 RX ORDER — HYDROCODONE BITARTRATE AND ACETAMINOPHEN 7.5; 325 MG/1; MG/1
1 TABLET ORAL EVERY 6 HOURS PRN
Qty: 20 TABLET | Refills: 0 | Status: SHIPPED | OUTPATIENT
Start: 2020-07-08 | End: 2020-07-13

## 2020-07-08 RX ORDER — EPHEDRINE SULFATE/0.9% NACL/PF 50 MG/5 ML
SYRINGE (ML) INTRAVENOUS PRN
Status: DISCONTINUED | OUTPATIENT
Start: 2020-07-08 | End: 2020-07-08 | Stop reason: SDUPTHER

## 2020-07-08 RX ORDER — DEXAMETHASONE SODIUM PHOSPHATE 4 MG/ML
INJECTION, SOLUTION INTRA-ARTICULAR; INTRALESIONAL; INTRAMUSCULAR; INTRAVENOUS; SOFT TISSUE PRN
Status: DISCONTINUED | OUTPATIENT
Start: 2020-07-08 | End: 2020-07-08 | Stop reason: ALTCHOICE

## 2020-07-08 RX ORDER — OXYMETAZOLINE HYDROCHLORIDE 0.05 G/100ML
SPRAY NASAL PRN
Status: DISCONTINUED | OUTPATIENT
Start: 2020-07-08 | End: 2020-07-08 | Stop reason: ALTCHOICE

## 2020-07-08 RX ORDER — ONDANSETRON 2 MG/ML
4 INJECTION INTRAMUSCULAR; INTRAVENOUS
Status: DISCONTINUED | OUTPATIENT
Start: 2020-07-08 | End: 2020-07-08 | Stop reason: HOSPADM

## 2020-07-08 RX ORDER — LABETALOL 20 MG/4 ML (5 MG/ML) INTRAVENOUS SYRINGE
5 EVERY 10 MIN PRN
Status: DISCONTINUED | OUTPATIENT
Start: 2020-07-08 | End: 2020-07-08 | Stop reason: HOSPADM

## 2020-07-08 RX ORDER — LIDOCAINE HYDROCHLORIDE 20 MG/ML
INJECTION, SOLUTION INTRAVENOUS PRN
Status: DISCONTINUED | OUTPATIENT
Start: 2020-07-08 | End: 2020-07-08 | Stop reason: SDUPTHER

## 2020-07-08 RX ORDER — DEXAMETHASONE SODIUM PHOSPHATE 4 MG/ML
12 INJECTION, SOLUTION INTRA-ARTICULAR; INTRALESIONAL; INTRAMUSCULAR; INTRAVENOUS; SOFT TISSUE
Status: DISCONTINUED | OUTPATIENT
Start: 2020-07-08 | End: 2020-07-08 | Stop reason: HOSPADM

## 2020-07-08 RX ORDER — GINSENG 100 MG
CAPSULE ORAL PRN
Status: DISCONTINUED | OUTPATIENT
Start: 2020-07-08 | End: 2020-07-08 | Stop reason: ALTCHOICE

## 2020-07-08 RX ORDER — ONDANSETRON 2 MG/ML
INJECTION INTRAMUSCULAR; INTRAVENOUS PRN
Status: DISCONTINUED | OUTPATIENT
Start: 2020-07-08 | End: 2020-07-08 | Stop reason: SDUPTHER

## 2020-07-08 RX ORDER — ROPIVACAINE HYDROCHLORIDE 5 MG/ML
INJECTION, SOLUTION EPIDURAL; INFILTRATION; PERINEURAL PRN
Status: DISCONTINUED | OUTPATIENT
Start: 2020-07-08 | End: 2020-07-08 | Stop reason: ALTCHOICE

## 2020-07-08 RX ORDER — LIDOCAINE HYDROCHLORIDE 40 MG/ML
SOLUTION TOPICAL PRN
Status: DISCONTINUED | OUTPATIENT
Start: 2020-07-08 | End: 2020-07-08 | Stop reason: SDUPTHER

## 2020-07-08 RX ORDER — PROMETHAZINE HYDROCHLORIDE 25 MG/ML
6.25 INJECTION, SOLUTION INTRAMUSCULAR; INTRAVENOUS
Status: DISCONTINUED | OUTPATIENT
Start: 2020-07-08 | End: 2020-07-08 | Stop reason: HOSPADM

## 2020-07-08 RX ORDER — PROPOFOL 10 MG/ML
INJECTION, EMULSION INTRAVENOUS PRN
Status: DISCONTINUED | OUTPATIENT
Start: 2020-07-08 | End: 2020-07-08 | Stop reason: SDUPTHER

## 2020-07-08 RX ORDER — LIDOCAINE HYDROCHLORIDE AND EPINEPHRINE 10; 10 MG/ML; UG/ML
INJECTION, SOLUTION INFILTRATION; PERINEURAL PRN
Status: DISCONTINUED | OUTPATIENT
Start: 2020-07-08 | End: 2020-07-08 | Stop reason: ALTCHOICE

## 2020-07-08 RX ORDER — ROCURONIUM BROMIDE 10 MG/ML
INJECTION, SOLUTION INTRAVENOUS PRN
Status: DISCONTINUED | OUTPATIENT
Start: 2020-07-08 | End: 2020-07-08 | Stop reason: SDUPTHER

## 2020-07-08 RX ADMIN — ONDANSETRON HYDROCHLORIDE 4 MG: 4 INJECTION, SOLUTION INTRAMUSCULAR; INTRAVENOUS at 07:43

## 2020-07-08 RX ADMIN — LIDOCAINE HYDROCHLORIDE 4 ML: 40 SOLUTION TOPICAL at 07:43

## 2020-07-08 RX ADMIN — ROCURONIUM BROMIDE 10 MG: 10 INJECTION INTRAVENOUS at 10:28

## 2020-07-08 RX ADMIN — FENTANYL CITRATE 100 MCG: 50 INJECTION, SOLUTION INTRAMUSCULAR; INTRAVENOUS at 07:43

## 2020-07-08 RX ADMIN — ROCURONIUM BROMIDE 30 MG: 10 INJECTION INTRAVENOUS at 07:55

## 2020-07-08 RX ADMIN — Medication 0.6 MG: at 11:40

## 2020-07-08 RX ADMIN — Medication 120 MG: at 07:43

## 2020-07-08 RX ADMIN — SODIUM CHLORIDE: 9 INJECTION, SOLUTION INTRAVENOUS at 07:35

## 2020-07-08 RX ADMIN — LIDOCAINE HYDROCHLORIDE 50 MG: 20 INJECTION, SOLUTION INTRAVENOUS at 07:43

## 2020-07-08 RX ADMIN — DEXAMETHASONE SODIUM PHOSPHATE 12 MG: 4 INJECTION, SOLUTION INTRA-ARTICULAR; INTRALESIONAL; INTRAMUSCULAR; INTRAVENOUS; SOFT TISSUE at 07:17

## 2020-07-08 RX ADMIN — HYDROCODONE BITARTRATE AND ACETAMINOPHEN 1 TABLET: 7.5; 325 TABLET ORAL at 13:49

## 2020-07-08 RX ADMIN — Medication 20 MG: at 07:54

## 2020-07-08 RX ADMIN — DEXAMETHASONE SODIUM PHOSPHATE 10 MG: 4 INJECTION, SOLUTION INTRAMUSCULAR; INTRAVENOUS at 07:43

## 2020-07-08 RX ADMIN — PROPOFOL 200 MG: 10 INJECTION, EMULSION INTRAVENOUS at 07:43

## 2020-07-08 RX ADMIN — Medication 3 MG: at 11:40

## 2020-07-08 RX ADMIN — Medication 20 MG: at 07:46

## 2020-07-08 RX ADMIN — SODIUM CHLORIDE: 9 INJECTION, SOLUTION INTRAVENOUS at 07:16

## 2020-07-08 RX ADMIN — FENTANYL CITRATE 50 MCG: 50 INJECTION, SOLUTION INTRAMUSCULAR; INTRAVENOUS at 10:27

## 2020-07-08 RX ADMIN — SODIUM CHLORIDE: 9 INJECTION, SOLUTION INTRAVENOUS at 09:00

## 2020-07-08 RX ADMIN — FENTANYL CITRATE 50 MCG: 50 INJECTION, SOLUTION INTRAMUSCULAR; INTRAVENOUS at 09:54

## 2020-07-08 ASSESSMENT — PULMONARY FUNCTION TESTS
PIF_VALUE: 17
PIF_VALUE: 18
PIF_VALUE: 17
PIF_VALUE: 18
PIF_VALUE: 17
PIF_VALUE: 18
PIF_VALUE: 15
PIF_VALUE: 17
PIF_VALUE: 18
PIF_VALUE: 17
PIF_VALUE: 17
PIF_VALUE: 18
PIF_VALUE: 19
PIF_VALUE: 18
PIF_VALUE: 17
PIF_VALUE: 18
PIF_VALUE: 17
PIF_VALUE: 18
PIF_VALUE: 18
PIF_VALUE: 17
PIF_VALUE: 18
PIF_VALUE: 17
PIF_VALUE: 18
PIF_VALUE: 17
PIF_VALUE: 17
PIF_VALUE: 18
PIF_VALUE: 17
PIF_VALUE: 18
PIF_VALUE: 17
PIF_VALUE: 18
PIF_VALUE: 18
PIF_VALUE: 17
PIF_VALUE: 17
PIF_VALUE: 18
PIF_VALUE: 17
PIF_VALUE: 18
PIF_VALUE: 17
PIF_VALUE: 1
PIF_VALUE: 18
PIF_VALUE: 18
PIF_VALUE: 17
PIF_VALUE: 15
PIF_VALUE: 2
PIF_VALUE: 18
PIF_VALUE: 18
PIF_VALUE: 16
PIF_VALUE: 18
PIF_VALUE: 17
PIF_VALUE: 17
PIF_VALUE: 18
PIF_VALUE: 18
PIF_VALUE: 17
PIF_VALUE: 18
PIF_VALUE: 21
PIF_VALUE: 17
PIF_VALUE: 18
PIF_VALUE: 17
PIF_VALUE: 2
PIF_VALUE: 18
PIF_VALUE: 17
PIF_VALUE: 17
PIF_VALUE: 18
PIF_VALUE: 17
PIF_VALUE: 18
PIF_VALUE: 17
PIF_VALUE: 0
PIF_VALUE: 17
PIF_VALUE: 18
PIF_VALUE: 16
PIF_VALUE: 3
PIF_VALUE: 18
PIF_VALUE: 18
PIF_VALUE: 17
PIF_VALUE: 18
PIF_VALUE: 17
PIF_VALUE: 18
PIF_VALUE: 17
PIF_VALUE: 18
PIF_VALUE: 17
PIF_VALUE: 18
PIF_VALUE: 17
PIF_VALUE: 2
PIF_VALUE: 17
PIF_VALUE: 18
PIF_VALUE: 15
PIF_VALUE: 16
PIF_VALUE: 18
PIF_VALUE: 17
PIF_VALUE: 18
PIF_VALUE: 17
PIF_VALUE: 17
PIF_VALUE: 18
PIF_VALUE: 17
PIF_VALUE: 7
PIF_VALUE: 17
PIF_VALUE: 0
PIF_VALUE: 18
PIF_VALUE: 17
PIF_VALUE: 18
PIF_VALUE: 18
PIF_VALUE: 17
PIF_VALUE: 18
PIF_VALUE: 17
PIF_VALUE: 18
PIF_VALUE: 17
PIF_VALUE: 18
PIF_VALUE: 17
PIF_VALUE: 18
PIF_VALUE: 17
PIF_VALUE: 18
PIF_VALUE: 17
PIF_VALUE: 18
PIF_VALUE: 17
PIF_VALUE: 17
PIF_VALUE: 18
PIF_VALUE: 17
PIF_VALUE: 18
PIF_VALUE: 17
PIF_VALUE: 17
PIF_VALUE: 18
PIF_VALUE: 17
PIF_VALUE: 16
PIF_VALUE: 17
PIF_VALUE: 18
PIF_VALUE: 17
PIF_VALUE: 18
PIF_VALUE: 17
PIF_VALUE: 2
PIF_VALUE: 17
PIF_VALUE: 18
PIF_VALUE: 3
PIF_VALUE: 18
PIF_VALUE: 15
PIF_VALUE: 18
PIF_VALUE: 17
PIF_VALUE: 18
PIF_VALUE: 18
PIF_VALUE: 17
PIF_VALUE: 18
PIF_VALUE: 17
PIF_VALUE: 17
PIF_VALUE: 16
PIF_VALUE: 18
PIF_VALUE: 17
PIF_VALUE: 1
PIF_VALUE: 18
PIF_VALUE: 18
PIF_VALUE: 17
PIF_VALUE: 17
PIF_VALUE: 18
PIF_VALUE: 18
PIF_VALUE: 17
PIF_VALUE: 18
PIF_VALUE: 18
PIF_VALUE: 17
PIF_VALUE: 18
PIF_VALUE: 17
PIF_VALUE: 15
PIF_VALUE: 17
PIF_VALUE: 18
PIF_VALUE: 17
PIF_VALUE: 16
PIF_VALUE: 18
PIF_VALUE: 18
PIF_VALUE: 2
PIF_VALUE: 17
PIF_VALUE: 18
PIF_VALUE: 17
PIF_VALUE: 1
PIF_VALUE: 17
PIF_VALUE: 17
PIF_VALUE: 18
PIF_VALUE: 18
PIF_VALUE: 17
PIF_VALUE: 19
PIF_VALUE: 18
PIF_VALUE: 18
PIF_VALUE: 17
PIF_VALUE: 18
PIF_VALUE: 18
PIF_VALUE: 17
PIF_VALUE: 17
PIF_VALUE: 38
PIF_VALUE: 17
PIF_VALUE: 4

## 2020-07-08 ASSESSMENT — PAIN - FUNCTIONAL ASSESSMENT: PAIN_FUNCTIONAL_ASSESSMENT: 0-10

## 2020-07-08 ASSESSMENT — PAIN SCALES - GENERAL
PAINLEVEL_OUTOF10: 5
PAINLEVEL_OUTOF10: 5
PAINLEVEL_OUTOF10: 0
PAINLEVEL_OUTOF10: 4
PAINLEVEL_OUTOF10: 3

## 2020-07-08 ASSESSMENT — PAIN DESCRIPTION - FREQUENCY
FREQUENCY: CONTINUOUS
FREQUENCY: CONTINUOUS

## 2020-07-08 ASSESSMENT — PAIN DESCRIPTION - LOCATION
LOCATION: THROAT
LOCATION: THROAT;NOSE

## 2020-07-08 ASSESSMENT — PAIN DESCRIPTION - DESCRIPTORS
DESCRIPTORS: BURNING
DESCRIPTORS: BURNING;CONSTANT

## 2020-07-08 ASSESSMENT — PAIN DESCRIPTION - PAIN TYPE
TYPE: SURGICAL PAIN
TYPE: SURGICAL PAIN

## 2020-07-08 NOTE — PROGRESS NOTES
Visualized back of pt throat, no drainage noted. Left side at back of throat is reddened. Pt states throat pain is on the right and into right ear.

## 2020-07-08 NOTE — ANESTHESIA POSTPROCEDURE EVALUATION
Department of Anesthesiology  Postprocedure Note    Patient: Omaira Zuluaga  MRN: [id number]  YOB: 1960  Date of evaluation: 7/8/2020  Time:  2:29 PM     Procedure Summary     Date:  07/08/20 Room / Location:  [address]    Anesthesia Start:  5143 Anesthesia Stop:  1157    Procedure:  IGS SINUS MAXILLARY ANTROSTOMY, FRONTAL SINUSOTOMY WITH REMOVAL OF TISSUE, ANTERIOR AND POSTERIOR ETHMOIDECTOMY, SPHENOIDECTOMY-ALL BILATERAL (Bilateral Nose) Diagnosis:  (CHRONIC SINUSITIS-FRONTAL, ETHMOID, MAXILLARY, SPHENOID)    Surgeon:  Felicia Christopher MD Responsible Provider:  Brenda Segal MD    Anesthesia Type:  general ASA Status:  2          Anesthesia Type: general    Spike Phase I: Spike Score: 9    Spike Phase II:      Last vitals: Reviewed and per EMR flowsheets. Anesthesia Post Evaluation    Patient location during evaluation: PACU  Patient participation: complete - patient participated  Level of consciousness: awake and alert  Airway patency: patent  Nausea & Vomiting: no nausea and no vomiting  Complications: no  Cardiovascular status: hemodynamically stable  Respiratory status: acceptable  Hydration status: euvolemic      [facility]  POST-ANESTHESIA NOTE       Name:  Omaira Zuluaga                                         Age:  63 y.o.   MRN:  [id number]      Last Vitals:  /79   Pulse 101   Temp 98.6 °F (37 °C) (Temporal)   Resp 16   Ht 5' 3.5\" (1.613 m)   Wt 130 lb 6.4 oz (59.1 kg)   SpO2 97%   BMI 22.74 kg/m²   Patient Vitals for the past 4 hrs:   BP Temp Temp src Pulse Resp SpO2   07/08/20 1321 133/79 -- -- 101 16 97 %   07/08/20 1240 127/71 98.6 °F (37 °C) Temporal 102 16 96 %   07/08/20 1225 122/69 -- -- 98 14 96 %   07/08/20 1220 126/68 -- -- 101 15 97 %   07/08/20 1215 126/66 -- -- 106 12 96 %   07/08/20 1210 127/66 -- -- 109 14 95 %   07/08/20 1205 127/64 -- -- 115 12 95 %   07/08/20 1200 129/64 -- -- 120 10 95 %   07/08/20 1155 118/64 -- -- 124 10 96 % 07/08/20 1152 -- 100.2 °F (37.9 °C) Temporal 123 12 96 %       Level of Consciousness:  Awake    Respiratory:  Stable    Oxygen Saturation:  Stable    Cardiovascular:  Stable    Hydration:  Adequate    PONV:  Stable    Post-op Pain:  Adequate analgesia    Post-op Assessment:  No apparent anesthetic complications    Additional Follow-Up / Treatment / Comment:  None    Toño Manning MD  July 8, 2020   2:29 PM

## 2020-07-08 NOTE — BRIEF OP NOTE
Brief Postoperative Note      Patient: Kamini Sanchez  YOB: 1960  MRN: [id number]    Date of Procedure: 7/8/2020    Pre-Op Diagnosis: CHRONIC SINUSITIS-FRONTAL, ETHMOID, MAXILLARY, SPHENOID    Post-Op Diagnosis: Same       Procedure(s):  IGS SINUS MAXILLARY ANTROSTOMY, FRONTAL SINUSOTOMY,  ANTERIOR AND POSTERIOR ETHMOIDECTOMY, SPHENOIDOTOMY-ALL BILATERAL    Surgeon(s):  Tino Harris MD    Assistant:  Surgical Assistant: Mike Oh    Anesthesia: General    Estimated Blood Loss (mL): less than 928     Complications: None    Specimens:   ID Type Source Tests Collected by Time Destination   1 : lt ethmoid Swab Sinus GRAM STAIN, CULTURE, AEROBIC Tino Harris MD 7/8/2020 0848    2 : lt sphenoid Swab Sinus CULTURE, ANAEROBIC AND AEROBIC Tino Harris MD 7/8/2020 0935    A : sinus contents Tissue Sinus SURGICAL PATHOLOGY Tino Harris MD 7/8/2020 1046        Implants:  Implant Name Type Inv. Item Serial No.  Lot No. LRB No. Used Action   KIT SEALANT SURGIFLO HEMOSTATIC MATRIX Bone/Graft/Tissue/Human/Synth KIT SEALANT SURGIFLO HEMOSTATIC MATRIX  [place]: [address] [facility] [id number] N/A 1 Implanted   IMPL SINUS STEROID RELEASE PROPEL MINI Face/Chin/Dental/Voice IMPL SINUS STEROID RELEASE PROPEL MINI  INTERSECT ENT [id number] N/A 1 Implanted   IMPL SINUS STEROID RELEASE PROPEL MINI Face/Chin/Dental/Voice IMPL SINUS STEROID RELEASE PROPEL MINI  INTERSECT ENT [id number] N/A 1 Implanted         Drains:   Urethral Catheter 16 fr (Active)       Findings: dFFUSE MUCOSAL SWELLING, ALL SINUSES HAD THICK MUCOID CREAMY OPAQUE PUS; BILATERAL MINI-PROPEL STENTS IN FRONTAL RECESSES.     Electronically signed by Lexie De Souza MD on 7/8/2020 at 12:03 PM

## 2020-07-08 NOTE — PROGRESS NOTES
Pt and family oriented to SDS 6 and unit. Pt verbalized approval for first name, last initial and physician name on unit whiteboard. Fall band applied. Vaccine information given. Plan of care reviewed.

## 2020-07-08 NOTE — PROGRESS NOTES
1152- Patient to PACU. Hooked up to monitor. Report from DONNA Deras and LUIS CARLOS Jenkins. Patient arouses to name. 1200- Dr. Dalila Fuller at bedside. 1205- Patient more awake and alert. Denies pain and nausea. Vitals stable. States she feels hot, temp 100.2, applied fan for comfort. 1214- Patient states she is starting to feel some burning pain, tolerable. Denies nausea, tolerating ice chips. Vitals stable. 1226- Asleep, easy to arouse. States pain unchanged and tolerable, denies nausea. Vitals stable. Meets PACU discharge criteria. Transported to [facility] room 6 by RN in stable condition. Report to [facility] LUIS CARLOS, Javed Wilson. Family present in [facility].

## 2020-07-09 ENCOUNTER — OFFICE VISIT (OUTPATIENT)
Dept: ENT CLINIC | Age: 60
End: 2020-07-09
Payer: COMMERCIAL

## 2020-07-09 VITALS
TEMPERATURE: 98.1 F | BODY MASS INDEX: 22.53 KG/M2 | HEIGHT: 64 IN | RESPIRATION RATE: 14 BRPM | DIASTOLIC BLOOD PRESSURE: 70 MMHG | SYSTOLIC BLOOD PRESSURE: 122 MMHG | HEART RATE: 80 BPM | WEIGHT: 132 LBS

## 2020-07-09 PROCEDURE — G8427 DOCREV CUR MEDS BY ELIG CLIN: HCPCS | Performed by: OTOLARYNGOLOGY

## 2020-07-09 PROCEDURE — 3017F COLORECTAL CA SCREEN DOC REV: CPT | Performed by: OTOLARYNGOLOGY

## 2020-07-09 PROCEDURE — 1036F TOBACCO NON-USER: CPT | Performed by: OTOLARYNGOLOGY

## 2020-07-09 PROCEDURE — G8420 CALC BMI NORM PARAMETERS: HCPCS | Performed by: OTOLARYNGOLOGY

## 2020-07-09 PROCEDURE — 99213 OFFICE O/P EST LOW 20 MIN: CPT | Performed by: OTOLARYNGOLOGY

## 2020-07-09 RX ORDER — ESTRADIOL 0.1 MG/G
CREAM VAGINAL
COMMUNITY
Start: 2020-04-15

## 2020-07-09 RX ORDER — BETAMETHASONE DIPROPIONATE 0.5 MG/G
LOTION TOPICAL
COMMUNITY
Start: 2020-05-26

## 2020-07-09 RX ORDER — OMEPRAZOLE 20 MG/1
CAPSULE, DELAYED RELEASE ORAL
COMMUNITY
Start: 2020-07-06

## 2020-07-09 ASSESSMENT — ENCOUNTER SYMPTOMS
CHEST TIGHTNESS: 0
COLOR CHANGE: 0
VOMITING: 0
CHOKING: 0
SHORTNESS OF BREATH: 0
SINUS PRESSURE: 0
DIARRHEA: 0
RHINORRHEA: 0
COUGH: 0
TROUBLE SWALLOWING: 0
VOICE CHANGE: 0
SORE THROAT: 0
ABDOMINAL PAIN: 0
STRIDOR: 0
WHEEZING: 0
APNEA: 0
FACIAL SWELLING: 0
NAUSEA: 0

## 2020-07-09 NOTE — OP NOTE
[address] [place] [address]                                OPERATIVE REPORT    PATIENT NAME: LATOSHA MARCOS                        :        1960  MED REC NO:   [id number]                           ROOM:  ACCOUNT NO:   [de-identified]                           ADMIT DATE: 2020  PROVIDER:     Kathrin Silva. Ita Morales M.D.    Leeann Marsist:  2020    PREOPERATIVE DIAGNOSES:  Chronic frontal, ethmoid, maxillary, and  sphenoid sinusitis. Sarcoidosis. POSTOPERATIVE DIAGNOSES:  Chronic frontal, ethmoid, maxillary, and  sphenoid sinusitis. Sarcoidosis. OPERATIONS:  Bilateral intranasal endoscopic frontal sinusotomy,  bilateral complete endoscopic ethmoidectomies, bilateral maxillary  antrostomies, bilateral endoscopic sphenoidostomies, and stereotactic  computerized image guidance for sinus surgery. SURGEON:  Esau Ball MD    ANESTHESIA:  General endotracheal.    HISTORY AND OPERATIVE FINDINGS:  This is a 27-year-old female with  sarcoid in her lungs who has had chronic sinusitis now for an  exceptionally long time. This did not get better on antibiotics,  irrigations, topical steroid sprays. She has been on a  fibroblast-inhibitor that was stopped a month ago and will be restarted  once she is basically healed up. Findings at surgery revealed diffuse mucosal inflammation and  thickening. There was soft watery edema throughout. All the sinus  pathways were obstructed with this and the anatomic variations. This  obstruction was documented preoperatively on the CT. Cultures were  taken from a couple of the sinuses, especially the left sphenoid, which  will be checked for both aerobic and anaerobic bacteria with Gram stain. The frontals, maxillaries, and sphenoid sinuses had long stringy pus  suctioned from them. Ethmoids were opacified bilaterally. Estimated  blood loss was less than 100 mL.   Vasoconstriction sphenoethmoidal recess which was terribly swollen  and occluded was dissected with the microdebrider, and then, sphenoid  sinus ostium was palpated. The sphenoid sinus ostium was then enlarged  in the superior and lateral direction with the microdebrider and bipolar  cautery was used along the cut edges of the sphenoidostomy as well as  the superior turbinate. The cottonoids impregnated with Afrin were then placed in the operative  sites in the left side and attention was turned to the right side. Analogous procedures were performed. The right uncinate process was incised and removed and the natural  ostium was identified. This was decompressed, and a, \"Craig\"  nasoantral window was thus created. There was pus present and that was  suctioned. The right frontal recess was then dissected using curved tracking probe,  and with suction and microdebrider blade. The opening into the frontal  sinus was identified and opened and then cottonoid impregnated with  Afrin was placed in that space. Right complete endoscopic ethmoidectomy was then performed using  microdebrider and cutting forceps as needed. Meticulous dissection was  carried out and the peripheral mucosal preservation was excellent. Superior meatus was checked for patency. The lower third of the right  superior turbinate was resected and the edge cauterized with bipolar  cautery. The sphenoethmoidal recess was very obstructed with soft tissue swelling  and a very small ostium. This was palpated, opened, and then a  sphenoidostomy created that was approximately 6 to 7 mm or more in  diameter. Cottonoids impregnated with Afrin were placed in that space. With all of the goals achieved, attention was then turned to hemostasis  and completing the procedure.   The ropivacaine neurovascular bundle  blocks with epinephrine were redone in both sides involving the  sphenopalatine and anterior ethmoid areas and also area of

## 2020-07-09 NOTE — PROGRESS NOTES
[facility] PROFESSIONAL SERVS  [facility]'S EAR, NOSE AND THROAT  [facility]  Dept: [phone]  Dept Fax: [phone]  Loc: [phone]    Lyndsay Sanchez is a 63 y.o. female who was referred byNo ref. provider found for:  Chief Complaint   Patient presents with    Post-Op Check     Patient is here post-op IGS sinus sx 7/8/2020   . HPI:     Juan Rangel is a 63 y.o. female who presents today for follow-up on her sinus surgery yesterday. She had extensive inflammation throughout all of her sinuses. Many of the bony lamina were much harder than they should have been, suggesting sclerosis from chronic sinusitis. History: Allergies   Allergen Reactions    Bactrim [Sulfamethoxazole-Trimethoprim] Hives    Sulfa Antibiotics Hives     Current Outpatient Medications   Medication Sig Dispense Refill    omeprazole (PRILOSEC) 20 MG delayed release capsule       estradiol (ESTRACE) 0.1 MG/GM vaginal cream INSERT 1 GRAM (1/4 APPLICATORFUL) VAGINALLY NIGHTLY AT BEDTIME 2 NIGHTS A WEEK.      HYDROcodone-acetaminophen (NORCO) 7.5-325 MG per tablet Take 1 tablet by mouth every 6 hours as needed for Pain for up to 5 days. 20 tablet 0    predniSONE (DELTASONE) 20 MG tablet Take 1 tablet by mouth daily for 3 days Then one-half tablet for two days. Start the day AFTER surgery 4 tablet 0    betamethasone dipropionate 0.05 % lotion APPLY TOPICALLY TO THE AFFECTED AREA ONCE A DAY FOR TWO WEEKS.  Multiple Vitamins-Minerals (MULTI FOR HER) TABS Take 1 tablet by mouth daily      vitamin C (ASCORBIC ACID) 500 MG tablet Take 500 mg by mouth daily      calcium carbonate 600 MG TABS tablet Take 1 tablet by mouth daily      Red Yeast Rice 600 MG TABS Take 1 tablet by mouth daily       No current facility-administered medications for this visit.       Past Medical History:   Diagnosis Date    Chronic idiopathic pulmonary fibrosis ([address]       Past Surgical History:   Procedure Laterality Date    CARPAL TUNNEL RELEASE Bilateral     COLONOSCOPY      SINUS ENDOSCOPY Bilateral 7/8/2020    IGS SINUS MAXILLARY ANTROSTOMY, FRONTAL SINUSOTOMY WITH REMOVAL OF TISSUE, ANTERIOR AND POSTERIOR ETHMOIDECTOMY, SPHENOIDECTOMY-ALL BILATERAL performed by Tino Harris MD at [address]        Family History   Problem Relation Age of Onset    Heart Disease Mother     Diabetes Sister     Cancer Brother     Prostate Cancer Brother     Other Brother         Heart Valve Replace/Repair - birth defect    Heart Disease Brother     Heart Disease Brother      Social History     Tobacco Use    Smoking status: Never Smoker    Smokeless tobacco: Never Used   Substance Use Topics    Alcohol use: Not Currently     Comment: rare       Subjective:      Review of Systems   Constitutional: Negative for activity change, appetite change, chills, diaphoresis, fatigue, fever and unexpected weight change. HENT: Negative for congestion, dental problem, ear discharge, ear pain, facial swelling, hearing loss, mouth sores, nosebleeds, postnasal drip, rhinorrhea, sinus pressure, sneezing, sore throat, tinnitus, trouble swallowing and voice change. Eyes: Negative for visual disturbance. Respiratory: Negative for apnea, cough, choking, chest tightness, shortness of breath, wheezing and stridor. Cardiovascular: Negative for chest pain, palpitations and leg swelling. Gastrointestinal: Negative for abdominal pain, diarrhea, nausea and vomiting. Endocrine: Negative for cold intolerance, heat intolerance, polydipsia and polyuria. Genitourinary: Negative for dysuria, enuresis and hematuria. Musculoskeletal: Negative for arthralgias, gait problem, neck pain and neck stiffness. Skin: Negative for color change and rash. Allergic/Immunologic: Negative for environmental allergies, food allergies and immunocompromised state.    Neurological: Negative for dizziness, syncope, facial asymmetry, speech difficulty, light-headedness and headaches. Hematological: Negative for adenopathy. Does not bruise/bleed easily. Psychiatric/Behavioral: Negative for confusion and sleep disturbance. The patient is not nervous/anxious. Objective:   /70 (Site: Left Upper Arm, Position: Sitting)   Pulse 80   Temp 98.1 °F (36.7 °C) (Infrared)   Resp 14   Ht 5' 4\" (1.626 m)   Wt 132 lb (59.9 kg)   BMI 22.66 kg/m²     Physical Exam   Ears:  TIPF bilat. Nose: Nose is decongested and anesthetized with topical sprays. Nasal fossa is suctioned bilaterally. Clots are removed. Normal postoperative appearance. Data:  All of the past medical history, past surgical history, family history,social history, allergies and current medications were reviewed with the patient. Assessment & Plan   Diagnoses and all orders for this visit:     Diagnosis Orders   1. Bilateral hearing loss, unspecified hearing loss type  Audiometry with tympanometry       The findings were explained and her questions were answered. Follow the instructions from yesterday's AVS form regarding continuing the Afrin, and starting the saline irrigations in 3 days. Use NeilMed bottle, their packets, and only distilled water at least twice a day. Flush up one nostril and out the other, leaning forward over sink. Stop the Afrin after 4 more days. Pramod Hansen. Norma Purcell MD    **This report has been created using voice recognition software. It may contain minor errors which are inherent in voicerecognition technology. **

## 2020-07-10 LAB
AEROBIC CULTURE: NORMAL
GRAM STAIN RESULT: NORMAL

## 2020-07-13 LAB
AEROBIC CULTURE: NORMAL
ANAEROBIC CULTURE: NORMAL
GRAM STAIN RESULT: NORMAL

## 2020-07-16 ENCOUNTER — OFFICE VISIT (OUTPATIENT)
Dept: ENT CLINIC | Age: 60
End: 2020-07-16
Payer: COMMERCIAL

## 2020-07-16 VITALS
TEMPERATURE: 97.2 F | WEIGHT: 132 LBS | SYSTOLIC BLOOD PRESSURE: 104 MMHG | HEART RATE: 80 BPM | RESPIRATION RATE: 14 BRPM | DIASTOLIC BLOOD PRESSURE: 62 MMHG | HEIGHT: 64 IN | BODY MASS INDEX: 22.53 KG/M2

## 2020-07-16 PROCEDURE — 31237 NSL/SINS NDSC SURG BX POLYPC: CPT | Performed by: OTOLARYNGOLOGY

## 2020-07-16 PROCEDURE — 3017F COLORECTAL CA SCREEN DOC REV: CPT | Performed by: OTOLARYNGOLOGY

## 2020-07-16 PROCEDURE — 99213 OFFICE O/P EST LOW 20 MIN: CPT | Performed by: OTOLARYNGOLOGY

## 2020-07-16 PROCEDURE — 1036F TOBACCO NON-USER: CPT | Performed by: OTOLARYNGOLOGY

## 2020-07-16 PROCEDURE — G8427 DOCREV CUR MEDS BY ELIG CLIN: HCPCS | Performed by: OTOLARYNGOLOGY

## 2020-07-16 PROCEDURE — G8420 CALC BMI NORM PARAMETERS: HCPCS | Performed by: OTOLARYNGOLOGY

## 2020-07-16 ASSESSMENT — ENCOUNTER SYMPTOMS
SORE THROAT: 0
CHOKING: 0
APNEA: 0
VOICE CHANGE: 0
SHORTNESS OF BREATH: 0
FACIAL SWELLING: 0
ABDOMINAL PAIN: 0
NAUSEA: 0
CHEST TIGHTNESS: 0
VOMITING: 0
COUGH: 0
STRIDOR: 0
WHEEZING: 0
SINUS PRESSURE: 0
COLOR CHANGE: 0
RHINORRHEA: 0
TROUBLE SWALLOWING: 0
DIARRHEA: 0

## 2020-07-16 NOTE — PROGRESS NOTES
[facility]  [facility]  Dept: [phone]  Dept Fax: [phone]  Loc: [phone]    Nisreen Sanchez is a 63 y.o. female who was referred byNo ref. provider found for:  Chief Complaint   Patient presents with    Post-Op Check     Patient is here post-op IGS sinus sx 7/8/2020   . HPI:     Ilana Herrera is a 63 y.o. female who presents today for Post-OP IGS Sinus Surgery 7/8/20. She is having drainage, she wasn't having drainage after surgery but she is now. She has cough, chest hurting, head hurts face and teeth hurting. She is rinsing twice a day. She gets a lot of clots out. She is not using Afrin. Her neck is sore due to the extending of her neck. History: Allergies   Allergen Reactions    Bactrim [Sulfamethoxazole-Trimethoprim] Hives    Sulfa Antibiotics Hives     Current Outpatient Medications   Medication Sig Dispense Refill    omeprazole (PRILOSEC) 20 MG delayed release capsule       estradiol (ESTRACE) 0.1 MG/GM vaginal cream INSERT 1 GRAM (1/4 APPLICATORFUL) VAGINALLY NIGHTLY AT BEDTIME 2 NIGHTS A WEEK.  betamethasone dipropionate 0.05 % lotion APPLY TOPICALLY TO THE AFFECTED AREA ONCE A DAY FOR TWO WEEKS.  Multiple Vitamins-Minerals (MULTI FOR HER) TABS Take 1 tablet by mouth daily      vitamin C (ASCORBIC ACID) 500 MG tablet Take 500 mg by mouth daily      calcium carbonate 600 MG TABS tablet Take 1 tablet by mouth daily      Red Yeast Rice 600 MG TABS Take 1 tablet by mouth daily       No current facility-administered medications for this visit.       Past Medical History:   Diagnosis Date    Chronic idiopathic pulmonary fibrosis (HCC)       Past Surgical History:   Procedure Laterality Date    CARPAL TUNNEL RELEASE Bilateral     COLONOSCOPY      SINUS ENDOSCOPY Bilateral 7/8/2020    IGS SINUS MAXILLARY ANTROSTOMY, FRONTAL SINUSOTOMY WITH REMOVAL OF TISSUE, ANTERIOR AND POSTERIOR ETHMOIDECTOMY, SPHENOIDECTOMY-ALL BILATERAL performed by Bre Betancourt MD at [address]        Family History   Problem Relation Age of Onset    Heart Disease Mother     Diabetes Sister     Cancer Brother     Prostate Cancer Brother     Other Brother         Heart Valve Replace/Repair - birth defect    Heart Disease Brother     Heart Disease Brother      Social History     Tobacco Use    Smoking status: Never Smoker    Smokeless tobacco: Never Used   Substance Use Topics    Alcohol use: Not Currently     Comment: rare       Subjective:       Review of Systems   Constitutional: Negative for activity change, appetite change, chills, diaphoresis, fatigue, fever and unexpected weight change. HENT: Negative for congestion, dental problem, ear discharge, ear pain, facial swelling, hearing loss, mouth sores, nosebleeds, postnasal drip, rhinorrhea, sinus pressure, sneezing, sore throat, tinnitus, trouble swallowing and voice change. Eyes: Negative for visual disturbance. Respiratory: Negative for apnea, cough, choking, chest tightness, shortness of breath, wheezing and stridor. Cardiovascular: Negative for chest pain, palpitations and leg swelling. Gastrointestinal: Negative for abdominal pain, diarrhea, nausea and vomiting. Endocrine: Negative for cold intolerance, heat intolerance, polydipsia and polyuria. Genitourinary: Negative for dysuria, enuresis and hematuria. Musculoskeletal: Negative for arthralgias, gait problem, neck pain and neck stiffness. Skin: Negative for color change and rash. Allergic/Immunologic: Negative for environmental allergies, food allergies and immunocompromised state. Neurological: Negative for dizziness, syncope, facial asymmetry, speech difficulty, light-headedness and headaches. Hematological: Negative for adenopathy. Does not bruise/bleed easily.    Psychiatric/Behavioral: Negative for Additional findings are as noted. I reviewed the scribe's note and agree with the documented findings and plan of care. Any areas of disagreement are corrected. I agree with the chief complaint, past medical history, past surgical history, allergies, medications, social and family history as documented unless otherwise noted below.      Electronically signed by Claudia Celaya MD on 7/30/2020 at 10:46 PM

## 2020-07-23 ENCOUNTER — OFFICE VISIT (OUTPATIENT)
Dept: ENT CLINIC | Age: 60
End: 2020-07-23
Payer: COMMERCIAL

## 2020-07-23 VITALS
RESPIRATION RATE: 14 BRPM | DIASTOLIC BLOOD PRESSURE: 70 MMHG | BODY MASS INDEX: 22.53 KG/M2 | HEART RATE: 82 BPM | SYSTOLIC BLOOD PRESSURE: 110 MMHG | TEMPERATURE: 97.2 F | HEIGHT: 64 IN | WEIGHT: 132 LBS

## 2020-07-23 PROCEDURE — 3017F COLORECTAL CA SCREEN DOC REV: CPT | Performed by: OTOLARYNGOLOGY

## 2020-07-23 PROCEDURE — 1036F TOBACCO NON-USER: CPT | Performed by: OTOLARYNGOLOGY

## 2020-07-23 PROCEDURE — G8427 DOCREV CUR MEDS BY ELIG CLIN: HCPCS | Performed by: OTOLARYNGOLOGY

## 2020-07-23 PROCEDURE — G8420 CALC BMI NORM PARAMETERS: HCPCS | Performed by: OTOLARYNGOLOGY

## 2020-07-23 PROCEDURE — 99213 OFFICE O/P EST LOW 20 MIN: CPT | Performed by: OTOLARYNGOLOGY

## 2020-07-23 PROCEDURE — 31237 NSL/SINS NDSC SURG BX POLYPC: CPT | Performed by: OTOLARYNGOLOGY

## 2020-07-23 ASSESSMENT — ENCOUNTER SYMPTOMS
NAUSEA: 0
STRIDOR: 0
SHORTNESS OF BREATH: 0
VOICE CHANGE: 0
ABDOMINAL PAIN: 0
RHINORRHEA: 0
VOMITING: 0
FACIAL SWELLING: 0
DIARRHEA: 0
CHOKING: 0
TROUBLE SWALLOWING: 0
SORE THROAT: 0
COUGH: 0
COLOR CHANGE: 0
WHEEZING: 0
CHEST TIGHTNESS: 0
SINUS PRESSURE: 0
APNEA: 0

## 2020-07-23 NOTE — PROGRESS NOTES
[facility]  [facility] EAR, NOSE AND THROAT  [address]  Dept: [phone]  Dept Fax: [phone]  Loc: [phone]    Dulce Sanchez is a 63 y.o. female who was referred byNo ref. provider found for:  Chief Complaint   Patient presents with    Post-Op Check     Patient is here post-op IGS sinus sx 7/8/2020 c/o headaches, drainage. feeling worse then before surgery    . HPI:     Toney Mckeon is a 63 y.o. female who presents today for post op   Bilateral intranasal endoscopic frontal sinusotomy,  bilateral complete endoscopic ethmoidectomies,   bilateral maxillary antrostomies,   bilateral endoscopic sphenoidostomies,   and stereotactic computerized image guidance for sinus surgery on 7/8/20. She does not have blood coming out like she did. Her forehead hurts, cheeks hurt, has drainage and lots of mucus. She has not had nosebleeds prior to surgery. When she rinses it comes out like paste. History: Allergies   Allergen Reactions    Bactrim [Sulfamethoxazole-Trimethoprim] Hives    Sulfa Antibiotics Hives     Current Outpatient Medications   Medication Sig Dispense Refill    omeprazole (PRILOSEC) 20 MG delayed release capsule       estradiol (ESTRACE) 0.1 MG/GM vaginal cream INSERT 1 GRAM (1/4 APPLICATORFUL) VAGINALLY NIGHTLY AT BEDTIME 2 NIGHTS A WEEK.  betamethasone dipropionate 0.05 % lotion APPLY TOPICALLY TO THE AFFECTED AREA ONCE A DAY FOR TWO WEEKS.  Multiple Vitamins-Minerals (MULTI FOR HER) TABS Take 1 tablet by mouth daily      vitamin C (ASCORBIC ACID) 500 MG tablet Take 500 mg by mouth daily      calcium carbonate 600 MG TABS tablet Take 1 tablet by mouth daily      Red Yeast Rice 600 MG TABS Take 1 tablet by mouth daily      doxycycline hyclate (VIBRA-TABS) 100 MG tablet Take 1 tablet by mouth 2 times daily for 28 days 56 tablet 0     No current facility-administered medications for this visit. Past Medical History:   Diagnosis Date    Chronic idiopathic pulmonary fibrosis (HCC)       Past Surgical History:   Procedure Laterality Date    CARPAL TUNNEL RELEASE Bilateral     COLONOSCOPY      SINUS ENDOSCOPY Bilateral 7/8/2020    IGS SINUS MAXILLARY ANTROSTOMY, FRONTAL SINUSOTOMY WITH REMOVAL OF TISSUE, ANTERIOR AND POSTERIOR ETHMOIDECTOMY, SPHENOIDECTOMY-ALL BILATERAL performed by Curry Suggs MD at [address]        Family History   Problem Relation Age of Onset    Heart Disease Mother     Diabetes Sister     Cancer Brother     Prostate Cancer Brother     Other Brother         Heart Valve Replace/Repair - birth defect    Heart Disease Brother     Heart Disease Brother      Social History     Tobacco Use    Smoking status: Never Smoker    Smokeless tobacco: Never Used   Substance Use Topics    Alcohol use: Not Currently     Comment: rare       Subjective:       Review of Systems   Constitutional: Negative for activity change, appetite change, chills, diaphoresis, fatigue, fever and unexpected weight change. HENT: Negative for congestion, dental problem, ear discharge, ear pain, facial swelling, hearing loss, mouth sores, nosebleeds, postnasal drip, rhinorrhea, sinus pressure, sneezing, sore throat, tinnitus, trouble swallowing and voice change. Eyes: Negative for visual disturbance. Respiratory: Negative for apnea, cough, choking, chest tightness, shortness of breath, wheezing and stridor. Cardiovascular: Negative for chest pain, palpitations and leg swelling. Gastrointestinal: Negative for abdominal pain, diarrhea, nausea and vomiting. Endocrine: Negative for cold intolerance, heat intolerance, polydipsia and polyuria. Genitourinary: Negative for dysuria, enuresis and hematuria. Musculoskeletal: Negative for arthralgias, gait problem, neck pain and neck stiffness. Skin: Negative for color change and rash. Allergic/Immunologic: Negative for environmental allergies, food allergies and immunocompromised state. Neurological: Negative for dizziness, syncope, facial asymmetry, speech difficulty, light-headedness and headaches. Hematological: Negative for adenopathy. Does not bruise/bleed easily. Psychiatric/Behavioral: Negative for confusion and sleep disturbance. The patient is not nervous/anxious. Objective:     /70 (Site: Left Upper Arm, Position: Sitting)   Pulse 82   Temp 97.2 °F (36.2 °C) (Infrared)   Resp 14   Ht 5' 4\" (1.626 m)   Wt 132 lb (59.9 kg)   BMI 22.66 kg/m²     Physical Exam   Nose: Significant debris in the middle meati      PROCEDURE NOTE: SINUS DEBRIDEMENT     Nasal cavity was topically anesthetized and decongested. Rigid nasal endoscopy was performed and debris was removed from the middle meatus on on the Bilateral side using suction and/or instrumentation. Blood clots and adhesions were addressed. There was some purulence present which was cultured. Mucosa appears to be healing well. The patient tolerated the procedure well. Data:  All of the past medical history, past surgical history, family history,social history, allergies and current medications were reviewed with the patient. Assessment & Plan   Diagnoses and all orders for this visit:     Diagnosis Orders   1. Status post functional endoscopic sinus surgery (FESS)  WI NASAL SCOPE,BX/RMV POLYP/DEBRID    Culture, Aerobic   2. Chronic frontal sinusitis     3. Chronic ethmoidal sinusitis     4. Chronic maxillary sinusitis, left     5. Chronic sphenoidal sinusitis, left     6. Purulent rhinorrhea  WI NASAL SCOPE,BX/RMV POLYP/DEBRID    Culture, Aerobic       The findings were explained and her questions were answered. Options were discussed including  Continuing rinsing. She agreed. Return in about 2 weeks (around 8/6/2020).        Zita BEAVER CMA (AAMA), am scribing for, and in the presence of

## 2020-07-25 LAB
AEROBIC CULTURE: ABNORMAL
GRAM STAIN RESULT: ABNORMAL
ORGANISM: ABNORMAL

## 2020-07-29 ENCOUNTER — TELEPHONE (OUTPATIENT)
Dept: ENT CLINIC | Age: 60
End: 2020-07-29

## 2020-07-29 RX ORDER — DOXYCYCLINE HYCLATE 100 MG
100 TABLET ORAL 2 TIMES DAILY
Qty: 56 TABLET | Refills: 0 | Status: SHIPPED | OUTPATIENT
Start: 2020-07-29 | End: 2020-08-26

## 2020-07-29 NOTE — TELEPHONE ENCOUNTER
Please let patient know her culture grew bacteria called Serratia. Dr Bolivar Cantor would like to treat with Doxycyline oral antibiotic. Rx sent to pharmacy. Continue saline rinses. Keep FU appt as scheduled.

## 2020-07-29 NOTE — TELEPHONE ENCOUNTER
Patient called back and was informed her culture grew Serratia and Dr. Frank Dee would like to treat it with Doxycyline oral antibiotics and it was sent to her pharmacy. Patient was also informed to continue the saline rinses and keep follow up appointment as scheduled. Patient verbalized understanding.

## 2020-08-06 ENCOUNTER — HOSPITAL ENCOUNTER (OUTPATIENT)
Dept: AUDIOLOGY | Age: 60
Discharge: HOME | End: 2020-08-06
Payer: COMMERCIAL

## 2020-08-06 ENCOUNTER — OFFICE VISIT (OUTPATIENT)
Dept: ENT CLINIC | Age: 60
End: 2020-08-06
Payer: COMMERCIAL

## 2020-08-06 VITALS
HEART RATE: 78 BPM | HEIGHT: 64 IN | SYSTOLIC BLOOD PRESSURE: 116 MMHG | TEMPERATURE: 97.7 F | RESPIRATION RATE: 14 BRPM | DIASTOLIC BLOOD PRESSURE: 76 MMHG | BODY MASS INDEX: 22.53 KG/M2 | WEIGHT: 132 LBS

## 2020-08-06 PROCEDURE — 31237 NSL/SINS NDSC SURG BX POLYPC: CPT | Performed by: OTOLARYNGOLOGY

## 2020-08-06 PROCEDURE — 92567 TYMPANOMETRY: CPT | Performed by: AUDIOLOGIST

## 2020-08-06 PROCEDURE — G8427 DOCREV CUR MEDS BY ELIG CLIN: HCPCS | Performed by: OTOLARYNGOLOGY

## 2020-08-06 PROCEDURE — 3017F COLORECTAL CA SCREEN DOC REV: CPT | Performed by: OTOLARYNGOLOGY

## 2020-08-06 PROCEDURE — 92557 COMPREHENSIVE HEARING TEST: CPT | Performed by: AUDIOLOGIST

## 2020-08-06 PROCEDURE — 1036F TOBACCO NON-USER: CPT | Performed by: OTOLARYNGOLOGY

## 2020-08-06 PROCEDURE — 99213 OFFICE O/P EST LOW 20 MIN: CPT | Performed by: OTOLARYNGOLOGY

## 2020-08-06 PROCEDURE — G8420 CALC BMI NORM PARAMETERS: HCPCS | Performed by: OTOLARYNGOLOGY

## 2020-08-06 ASSESSMENT — ENCOUNTER SYMPTOMS
SINUS PRESSURE: 0
FACIAL SWELLING: 0
SORE THROAT: 0
SHORTNESS OF BREATH: 0
ABDOMINAL PAIN: 0
NAUSEA: 0
DIARRHEA: 0
VOICE CHANGE: 0
STRIDOR: 0
TROUBLE SWALLOWING: 0
COLOR CHANGE: 0
CHOKING: 0
VOMITING: 0
COUGH: 0
WHEEZING: 0
CHEST TIGHTNESS: 0
APNEA: 0
RHINORRHEA: 0

## 2020-08-06 NOTE — PROGRESS NOTES
AUDIOLOGICAL EVALUATION      REASON FOR TESTING:  Initial improvement in hearing following insertion of bilateral PE tubes. Patient explains that she is now experiencing hearing difficulty again. She feels like her ears are swollen. OTOSCOPY: PE tubes, bilateral.     AUDIOGRAM        Reliability: Good  Audiometer Used:  [id number]      COMMENTS: Mild low frequency conductive hearing loss, rising to normal hearing sensitivity for both ears. Speech discrimination ability is excellent at 100%, bilaterally. Tympanometry revealed flat tympanograms with large ear canal volumes, which suggests patent PE tubes for both ears. RECOMMENDATION(S):   1- Continue care with Dr. Sriram Potter today, as scheduled. 2- Repeat audiogram and tympanogram following any medical intervention.

## 2020-08-06 NOTE — PROGRESS NOTES
[facility] PROFESSIONAL SERVS  [facility]'S EAR, [facility]SE AND THROAT  [facility]  Dept: [phone]  Dept Fax: [phone]  Loc: [phone]    Phill Blizzard Cox is a 63 y.o. female who was referred byNo ref. provider found for:  Chief Complaint   Patient presents with    Post-Op Check     Patient is here for post-op exam for IGS and Sinus 07/08/2020. Roe Quezada HPI:     Sherri Naranjo is a 63 y.o. female who presents today for IGS      Audiogram/tympanogram:  COMMENTS: Mild low frequency conductive hearing loss, rising to normal hearing sensitivity for both ears. Speech discrimination ability is excellent at 100%, bilaterally. Tympanometry revealed flat tympanograms with large ear canal volumes, which suggests patent PE tubes for both ears. RECOMMENDATION(S):   1- Continue care with Dr. Johnny Weaver today, as scheduled. 2- Repeat audiogram and tympanogram following any medical intervention. Study shows Mild low frequency hearing loss. There is not hearing loss in her family. She is still having nasal drainage. Taking the antibiotic she is not noticing much change. She is allergic to sulfa. She had Sulfa twice and reacted both times. She has been on her medication for about a week. Culture grew Serratia marcescens    When she rinses she gets mucus out. She gets some blood. Having some \"snot\" upfront. States she can breathe, feels about the same as before she had surgery. History: Allergies   Allergen Reactions    Bactrim [Sulfamethoxazole-Trimethoprim] Hives    Sulfa Antibiotics Hives     Current Outpatient Medications   Medication Sig Dispense Refill    omeprazole (PRILOSEC) 20 MG delayed release capsule       estradiol (ESTRACE) 0.1 MG/GM vaginal cream INSERT 1 GRAM (1/4 APPLICATORFUL) VAGINALLY NIGHTLY AT BEDTIME 2 NIGHTS A WEEK.  betamethasone dipropionate 0.05 % lotion APPLY TOPICALLY TO THE AFFECTED AREA ONCE A DAY FOR TWO WEEKS.  Multiple Vitamins-Minerals (MULTI FOR HER) TABS Take 1 tablet by mouth daily      vitamin C (ASCORBIC ACID) 500 MG tablet Take 500 mg by mouth daily      calcium carbonate 600 MG TABS tablet Take 1 tablet by mouth daily      Red Yeast Rice 600 MG TABS Take 1 tablet by mouth daily       No current facility-administered medications for this visit. Past Medical History:   Diagnosis Date    Chronic idiopathic pulmonary fibrosis (HCC)       Past Surgical History:   Procedure Laterality Date    CARPAL TUNNEL RELEASE Bilateral     COLONOSCOPY      SINUS ENDOSCOPY Bilateral 7/8/2020    IGS SINUS MAXILLARY ANTROSTOMY, FRONTAL SINUSOTOMY WITH REMOVAL OF TISSUE, ANTERIOR AND POSTERIOR ETHMOIDECTOMY, SPHENOIDECTOMY-ALL BILATERAL performed by Kate Marcano MD at [address]        Family History   Problem Relation Age of Onset    Heart Disease Mother     Diabetes Sister     Cancer Brother     Prostate Cancer Brother     Other Brother         Heart Valve Replace/Repair - birth defect    Heart Disease Brother     Heart Disease Brother      Social History     Tobacco Use    Smoking status: Never Smoker    Smokeless tobacco: Never Used   Substance Use Topics    Alcohol use: Not Currently     Comment: rare       Subjective:       Review of Systems   Constitutional: Negative for activity change, appetite change, chills, diaphoresis, fatigue, fever and unexpected weight change. HENT: Negative for congestion, dental problem, ear discharge, ear pain, facial swelling, hearing loss, mouth sores, nosebleeds, postnasal drip, rhinorrhea, sinus pressure, sneezing, sore throat, tinnitus, trouble swallowing and voice change. Eyes: Negative for visual disturbance. Respiratory: Negative for apnea, cough, choking, chest tightness, shortness of breath, wheezing and stridor. Cardiovascular: Negative for chest pain, palpitations and leg swelling. Gastrointestinal: Negative for abdominal pain, diarrhea, nausea and vomiting. Endocrine: Negative for cold intolerance, heat intolerance, polydipsia and polyuria. Genitourinary: Negative for dysuria, enuresis and hematuria. Musculoskeletal: Negative for arthralgias, gait problem, neck pain and neck stiffness. Skin: Negative for color change and rash. Allergic/Immunologic: Negative for environmental allergies, food allergies and immunocompromised state. Neurological: Negative for dizziness, syncope, facial asymmetry, speech difficulty, light-headedness and headaches. Hematological: Negative for adenopathy. Does not bruise/bleed easily. Psychiatric/Behavioral: Negative for confusion and sleep disturbance. The patient is not nervous/anxious. Objective:     /76 (Site: Left Upper Arm, Position: Sitting)   Pulse 78   Temp 97.7 °F (36.5 °C) (Infrared)   Resp 14   Ht 5' 4\" (1.626 m)   Wt 132 lb (59.9 kg)   BMI 22.66 kg/m²     Physical Exam  Ears: Ears look good, no effusion. Nose: Debris in both middle meati. Purulent drainage. PROCEDURE NOTE: SINUS DEBRIDEMENT     Nasal cavity was topically anesthetized and decongested. Rigid nasal endoscopy was performed and debris was removed from the middle meatus on on the Bilateral side using suction and/or instrumentation. Blood clots and adhesions were addressed. Mucosa appears to be healing well. The patient tolerated the procedure well. Data:  All of the past medical history, past surgical history, family history,social history, allergies and current medications were reviewed with the patient. Assessment & Plan   Diagnoses and all orders for this visit:     Diagnosis Orders   1. Chronic frontal sinusitis  DE NASAL SCOPE,BX/RMV POLYP/DEBRID   2. Chronic ethmoidal sinusitis  DE NASAL SCOPE,BX/RMV POLYP/DEBRID   3. Chronic maxillary sinusitis, left  DE NASAL SCOPE,BX/RMV POLYP/DEBRID   4.  Chronic sphenoidal sinusitis, left  DE NASAL

## 2020-08-28 ENCOUNTER — OFFICE VISIT (OUTPATIENT)
Dept: ENT CLINIC | Age: 60
End: 2020-08-28
Payer: COMMERCIAL

## 2020-08-28 VITALS
HEIGHT: 64 IN | TEMPERATURE: 97.2 F | HEART RATE: 84 BPM | DIASTOLIC BLOOD PRESSURE: 64 MMHG | BODY MASS INDEX: 22.71 KG/M2 | SYSTOLIC BLOOD PRESSURE: 108 MMHG | WEIGHT: 133 LBS | RESPIRATION RATE: 14 BRPM

## 2020-08-28 PROCEDURE — 3017F COLORECTAL CA SCREEN DOC REV: CPT | Performed by: OTOLARYNGOLOGY

## 2020-08-28 PROCEDURE — 99213 OFFICE O/P EST LOW 20 MIN: CPT | Performed by: OTOLARYNGOLOGY

## 2020-08-28 PROCEDURE — G8420 CALC BMI NORM PARAMETERS: HCPCS | Performed by: OTOLARYNGOLOGY

## 2020-08-28 PROCEDURE — 31237 NSL/SINS NDSC SURG BX POLYPC: CPT | Performed by: OTOLARYNGOLOGY

## 2020-08-28 PROCEDURE — G8427 DOCREV CUR MEDS BY ELIG CLIN: HCPCS | Performed by: OTOLARYNGOLOGY

## 2020-08-28 PROCEDURE — 1036F TOBACCO NON-USER: CPT | Performed by: OTOLARYNGOLOGY

## 2020-08-28 ASSESSMENT — ENCOUNTER SYMPTOMS
SHORTNESS OF BREATH: 0
TROUBLE SWALLOWING: 0
STRIDOR: 0
VOICE CHANGE: 0
RHINORRHEA: 0
DIARRHEA: 0
COUGH: 0
VOMITING: 0
CHOKING: 0
ABDOMINAL PAIN: 0
WHEEZING: 0
CHEST TIGHTNESS: 0
SORE THROAT: 0
NAUSEA: 0
COLOR CHANGE: 0
APNEA: 0
SINUS PRESSURE: 0
FACIAL SWELLING: 0

## 2020-08-28 NOTE — PROGRESS NOTES
[facility] PROFESSIONAL SERVS  [facility]'S EAR, NOSE AND THROAT  [facility]  Dept: [phone]  Dept Fax: [phone]  Loc: Ragini Carmen is a 62 y.o. female who was referred byNo ref. provider found for:  Chief Complaint   Patient presents with    Follow-up     Patient is here for 3 week follow up s/p IGS and Sinus 07/08/2020 c/o nasally feeling and still having post nasal drip    . HPI:     Genet Hill is a 62 y.o. female who presents today for 3 week follow up S/p IGS and Sinus 7/8/20. Complaining of nasally feeling and still having post nasal drip. She finished taking her antibiotic 2 days ago. It did not help her much. Still draining. She gardens and also farms. She is allergic to sulfa. She had sulfa eye drops and her eye swelled shut, and she got hives from po Sulfatrim. .      Culture had grown Serratia marcescens. She had been on doxycycline. History: Allergies   Allergen Reactions    Bactrim [Sulfamethoxazole-Trimethoprim] Hives    Sulfa Antibiotics Hives     Current Outpatient Medications   Medication Sig Dispense Refill    omeprazole (PRILOSEC) 20 MG delayed release capsule       estradiol (ESTRACE) 0.1 MG/GM vaginal cream INSERT 1 GRAM (1/4 APPLICATORFUL) VAGINALLY NIGHTLY AT BEDTIME 2 NIGHTS A WEEK.  betamethasone dipropionate 0.05 % lotion APPLY TOPICALLY TO THE AFFECTED AREA ONCE A DAY FOR TWO WEEKS.  Multiple Vitamins-Minerals (MULTI FOR HER) TABS Take 1 tablet by mouth daily      vitamin C (ASCORBIC ACID) 500 MG tablet Take 500 mg by mouth daily      calcium carbonate 600 MG TABS tablet Take 1 tablet by mouth daily      Red Yeast Rice 600 MG TABS Take 1 tablet by mouth daily      ciprofloxacin (CIPRO) 500 MG tablet Take 1 tablet by mouth 2 times daily for 14 days 28 tablet 0     No current facility-administered medications for this visit.       Past Medical History:   Diagnosis Date    Chronic idiopathic pulmonary fibrosis (HCC)       Past Surgical History:   Procedure Laterality Date    CARPAL TUNNEL RELEASE Bilateral     COLONOSCOPY      SINUS ENDOSCOPY Bilateral 7/8/2020    IGS SINUS MAXILLARY ANTROSTOMY, FRONTAL SINUSOTOMY WITH REMOVAL OF TISSUE, ANTERIOR AND POSTERIOR ETHMOIDECTOMY, SPHENOIDECTOMY-ALL BILATERAL performed by Felicia Christopher MD at [address]       Family History   Problem Relation Age of Onset    Heart Disease Mother     Diabetes Sister     Cancer Brother     Prostate Cancer Brother     Other Brother         Heart Valve Replace/Repair - birth defect    Heart Disease Brother     Heart Disease Brother      Social History     Tobacco Use    Smoking status: Never Smoker    Smokeless tobacco: Never Used   Substance Use Topics    Alcohol use: Not Currently     Comment: rare       Subjective:       Review of Systems   Constitutional: Negative for activity change, appetite change, chills, diaphoresis, fatigue, fever and unexpected weight change. HENT: Negative for congestion, dental problem, ear discharge, ear pain, facial swelling, hearing loss, mouth sores, nosebleeds, postnasal drip, rhinorrhea, sinus pressure, sneezing, sore throat, tinnitus, trouble swallowing and voice change. Eyes: Negative for visual disturbance. Respiratory: Negative for apnea, cough, choking, chest tightness, shortness of breath, wheezing and stridor. Cardiovascular: Negative for chest pain, palpitations and leg swelling. Gastrointestinal: Negative for abdominal pain, diarrhea, nausea and vomiting. Endocrine: Negative for cold intolerance, heat intolerance, polydipsia and polyuria. Genitourinary: Negative for dysuria, enuresis and hematuria. Musculoskeletal: Negative for arthralgias, gait problem, neck pain and neck stiffness. Skin: Negative for color change and rash.    Allergic/Immunologic: Negative for environmental allergies, food allergies and immunocompromised state. Neurological: Negative for dizziness, syncope, facial asymmetry, speech difficulty, light-headedness and headaches. Hematological: Negative for adenopathy. Does not bruise/bleed easily. Psychiatric/Behavioral: Negative for confusion and sleep disturbance. The patient is not nervous/anxious. Objective:     /64 (Site: Left Upper Arm, Position: Sitting)   Pulse 84   Temp 97.2 °F (36.2 °C) (Infrared)   Resp 14   Ht 5' 4\" (1.626 m)   Wt 133 lb (60.3 kg)   BMI 22.83 kg/m²     Physical Exam   Nose right side normal left side small amount of mucoid drainage    PROCEDURE NOTE: SINUS DEBRIDEMENT     Nasal cavity was topically anesthetized and decongested. Rigid nasal endoscopy was performed and debris was removed from the middle meatus on on the Bilateral side using suction and/or instrumentation. Mucosa appears to be healing well except in the left frontal and left maxillary sinuses. Those pathways are also patent, but thick mucoid drainage is still coming out. It appears identical from both areas. This drainage is suctioned out after culturing it. The patient tolerated the procedure well. Data:  All of the past medical history, past surgical history, family history,social history, allergies and current medications were reviewed with the patient. Assessment & Plan   Diagnoses and all orders for this visit:     Diagnosis Orders   1. Serratia marcescens infection  YIMI Fontana MD, Infectious Disease, [facility]   2. Status post functional endoscopic sinus surgery (FESS)  Culture, Aerobic    [place] NASAL SCOPE,BX/RMV POLYP/DEBRID    YIMI Fontana MD, Infectious Disease, [facility]   3. Chronic frontal sinusitis  Culture, Aerobic    [place] NASAL SCOPE,BX/RMV POLYP/DEBRID    YIMI Fontana MD, Infectious Disease, [facility]   4. Chronic ethmoidal sinusitis  [place] NASAL SCOPE,BX/RMV POLYP/DEBRID   5.  Chronic maxillary sinusitis, left  Culture, Aerobic [place] NASAL SCOPE,BX/RMV POLYP/DEBRID    YIMI Krishna MD, Infectious Disease, [facility]   6. Chronic sphenoidal sinusitis, left  [place] NASAL SCOPE,BX/RMV POLYP/DEBRID   7. Purulent rhinorrhea     8. Status post myringotomy with tube placement of both ears     9. Sarcoidosis of lung ([address]     10. Allergy to sulfa drugs  YIMI Krishna MD, Infectious Disease, [facility]       The findings were explained and her questions were answered. 6 of the 8 sinuses have cleared but to remain grossly infected and we are at a loss for options regarding antibiotics. Fluoroquinolones would likely develop resistance fairly quickly and I am not sure she would tolerate the month or more of antibiotics it would take to eliminate the Serratia. Options were discussed including referring her to Dr. Chinedu Lofton, and taking a nasal culture. She will need to continue the buffered saline irrigations. Follow up in 1 month. I, Donnie Tom CMA (ELLIE), am scribing for, and in the presence of Dr. Deni Romero. Electronically signed by RAFA ChadwickMA) on 8/28/20 at 1:38 PM EDT. (Please note that portions of this note were completed with a voice recognition program. Efforts were made to edit the dictations butoccasionally words are mis-transcribed.)    I agree to the above documentation placed by my scribe. I have personally evaluated this patient. Additional findings are as noted. I reviewed the scribe's note and agree with the documented findings and plan of care. Any areas of disagreement are corrected. I agree with the chief complaint, past medical history, past surgical history, allergies, medications, social and family history as documented unless otherwise noted below.      Electronically signed by Bety Paul MD on 9/7/2020 at 10:59 PM

## 2020-08-30 LAB
AEROBIC CULTURE: NORMAL
GRAM STAIN RESULT: NORMAL

## 2020-09-01 ENCOUNTER — TELEPHONE (OUTPATIENT)
Dept: WOUND CARE | Age: 60
End: 2020-09-01

## 2020-09-01 NOTE — PROGRESS NOTES
Recent Travel Screening and Travel History documentation:     Travel Screening     Question   Response    In the last month, have you been in contact with someone who was confirmed or suspected to have Coronavirus / COVID-19? No / Unsure    Do you have any of the following symptoms? None of these    Have you traveled internationally in the last month? No      Travel History   Travel since 08/01/20     No documented travel since 08/01/20       Instructed patient to wear a face covering.

## 2020-09-01 NOTE — TELEPHONE ENCOUNTER
----- Message from [place] sent at 8/31/2020  9:18 AM EDT -----  Cristina Armenta from Dr Jackelin Gonzalez office called. She states that Dr Dolly Gomes spoke with Dr Annie Connell and he told Dr Dolly Gomes that the pt could be seen Wed Sept 2 by himself or Ivana Pereira.  Call Cristina Armenta #[phone]

## 2020-09-02 ENCOUNTER — HOSPITAL ENCOUNTER (OUTPATIENT)
Dept: WOUND CARE | Age: 60
Discharge: HOME | End: 2020-09-02
Payer: COMMERCIAL

## 2020-09-02 VITALS
TEMPERATURE: 97.5 F | RESPIRATION RATE: 16 BRPM | OXYGEN SATURATION: 99 % | SYSTOLIC BLOOD PRESSURE: 152 MMHG | DIASTOLIC BLOOD PRESSURE: 78 MMHG | HEART RATE: 77 BPM

## 2020-09-02 PROBLEM — R05.9 COUGH: Status: ACTIVE | Noted: 2017-05-03

## 2020-09-02 PROCEDURE — 99213 OFFICE O/P EST LOW 20 MIN: CPT | Performed by: NURSE PRACTITIONER

## 2020-09-02 RX ORDER — CIPROFLOXACIN 500 MG/1
500 TABLET, FILM COATED ORAL 2 TIMES DAILY
Qty: 28 TABLET | Refills: 0 | Status: SHIPPED | OUTPATIENT
Start: 2020-09-02 | End: 2020-09-16

## 2020-09-02 NOTE — PROGRESS NOTES
[facility] and Procedure Note      Sylvie Bender  MEDICAL RECORD NUMBER:  [id number]  AGE: 62 y.o. GENDER: female  : 1960  EPISODE DATE:  2020    SUBJECTIVE:     Chief Complaint   Patient presents with    Wound Check     ID          HISTORY OF PRESENT ILLNESS      Ed Goodell Cox is a 62 y.o. female who presents today for infectious disease evaluation related to ongoing purulent drainage from sinuses. Patient has been following with Dr. Javier Lauren for problem since 2019. Yue Chavez reports that she was on Augmentin for 2 months at initiation of problem. She reports a second antibiotic was given by pulmonologist but she is unsure of which antibiotic this was. Most recently she was on doxycycline for culture positive for Serratia marcescens,  prescribed by Dr. Javier Lauren. She reports no relief of symptoms with use of doxycycline. Has also been using saline nasal washes as recommended by Dr. Javier aLuren. Voices complaints of ongoing purulent nasal drainage, postnasal drip, cough-worse in the mornings on rising. Ongoing fatigue and malaise. She reports that symptoms have remain unchanged, denies recent worsening or improvement. Denies fever or chills, loss of appetite, weight loss, nausea, vomiting, diarrhea. Has chronic shortness of breath-followed by a pulmonologist for possible sarcoidosis. Yue Chavez travels daily for work, no known exposure to mold or environmental toxins. She denies any smoking history. No further concerns verbalized. Culture reports reviewed. 2020 culture showed moderate growth of Serratia marcescens. Repeated culture on 2020 as obtained from the frontal sinuses showed no growth.   PAST MEDICAL HISTORY             Diagnosis Date    Chronic idiopathic pulmonary fibrosis ([address]        PAST SURGICAL HISTORY     Past Surgical History:   Procedure Laterality Date    CARPAL TUNNEL RELEASE Bilateral     COLONOSCOPY      SINUS ENDOSCOPY Bilateral 7/8/2020    IGS SINUS MAXILLARY ANTROSTOMY, FRONTAL SINUSOTOMY WITH REMOVAL OF TISSUE, ANTERIOR AND POSTERIOR ETHMOIDECTOMY, SPHENOIDECTOMY-ALL BILATERAL performed by Brandi Christian MD at [address] [facility] HISTORY     Family History   Problem Relation Age of Onset    Heart Disease Mother     Diabetes Sister     Cancer Brother     Prostate Cancer Brother     Other Brother         Heart Valve Replace/Repair - birth defect    Heart Disease Brother     Heart Disease Brother        SOCIAL HISTORY     Social History     Tobacco Use    Smoking status: Never Smoker    Smokeless tobacco: Never Used   Substance Use Topics    Alcohol use: Not Currently     Comment: rare    Drug use: Never       ALLERGIES     Allergies   Allergen Reactions    Bactrim [Sulfamethoxazole-Trimethoprim] Hives    Sulfa Antibiotics Hives       MEDICATIONS     Current Outpatient Medications on File Prior to Encounter   Medication Sig Dispense Refill    omeprazole (PRILOSEC) 20 MG delayed release capsule       estradiol (ESTRACE) 0.1 MG/GM vaginal cream INSERT 1 GRAM (1/4 APPLICATORFUL) VAGINALLY NIGHTLY AT BEDTIME 2 NIGHTS A WEEK.  betamethasone dipropionate 0.05 % lotion APPLY TOPICALLY TO THE AFFECTED AREA ONCE A DAY FOR TWO WEEKS.  Multiple Vitamins-Minerals (MULTI FOR HER) TABS Take 1 tablet by mouth daily      vitamin C (ASCORBIC ACID) 500 MG tablet Take 500 mg by mouth daily      calcium carbonate 600 MG TABS tablet Take 1 tablet by mouth daily      Red Yeast Rice 600 MG TABS Take 1 tablet by mouth daily       No current facility-administered medications on file prior to encounter.         REVIEW OF SYSTEMS:     Constitutional: + Fatigue, malaise denies fever, chills, night sweats, weight loss/gain, loss of appetite   HEENT: + Sinus pain and pressure, nasal drainage, nasal congestion, postnasal drainage denies dizziness, loss of consciousness  Respiratory: Abbreviation  Name  Director  Address  Valid Date Range    [address]  Patt Villareal MD  [address] [facility] [address]  08/30/17 0855-Present    Narrative   Performed by: [facility]   Source: sinus       Site:           Current Antibiotics: not stated    Susceptibility     Serratia marcescens (1)     Antibiotic  Interpretation  APOORVA  Status     gentamicin  Sensitive  <=1  mcg/mL  Final     cefepime  Sensitive  <=1  mcg/mL  Final     ciprofloxacin  Sensitive  <=0.25  mcg/mL  Final     trimethoprim-sulfamethoxazole  Sensitive  <=20  mcg/mL  Final       8/30/2020  7:28 AM - [place] Incoming Lab Results From Soft     Specimen Information:  Sinus          Component  Collected  Lab    Aerobic Culture  08/28/2020  2:35 PM  [facility]    No growth-preliminary Normal jhoana     Gram Stain Result  08/28/2020  2:35 PM  [facility]    Rare segmented neutrophils observed. Rare epithelial cells observed. No bacteria seen. Testing Performed By     Ijeoma Babb  Name  Director  Address  Valid Date Range    [address]  Patt Villareal MD  [address] [facility] [address]  08/30/17 0855-Present    Narrative   Performed by: [facility]   Source: sinus       Site: frontal          Current Antibiotics: none        ASSESSMENT     -Chronic sinusitis  -Culture +seratia marcescens-not responsive to treatment with doxycycline     Patient Active Problem List   Diagnosis Code    Chronic frontal sinusitis J32.1    Chronic ethmoidal sinusitis J32.2    Chronic maxillary sinusitis, left J32.0    Chronic sphenoidal sinusitis, left J32.3    Sarc[facility]osis [facility] ([facility] D86.0       PLAN     Patient examined and evaluated    -Chronic sinusitis, unresponsive to treatment with Augmentin x2 months, Doxycycline. Most recent culture showed no growth. Due to [facility] ongoing symptoms, however, will treat with antibiotics at this time. Sensitivity from July culture reviewed. Due to sulfa allergy it was decided to proceed with treatment with Ciprofloxacin as culture showed sensitivity to cipro. Onur Pablo is agreeable to plan. Due to her career and frequent travel, IV antibiotics are not ideal at this time. Case discussed with Dr. Emiliano Graves who is agreeable with plan as above.      -Ok to continue sinus rinses as per ENT. Follow up with ENT as scheduled.    -Continue follow up with pulmonology regarding ongoing shortness of breath, cough, dyspnea with exertion. Antibiotics: Yes, Ciprofloxacin 500 mg by mouth twice daily x14 days. Recommended yogurt or probiotic daily while on antibiotics to avoid GI disturbance. Onur Pablo reports that she has tolerated Cipro well in the past.  Denies having taken for current infectious process. Antibiotic stop date: 9/16/2020    -Follow up 2 weeks for re-evaluation post treatment with Ciprofloxacin. Call with any concerns prior to scheduled visit. All questions and concerns addressed prior to discharge from today's visit. Please see attached Discharge Instructions    Written patient dismissal instructions given to patient and signed by patient or POA. Discharge Instructions         Discharge Instructions       Visit Discharge/Physician Orders:  - Antibiotic given for Ciprofloxacin for 14 days. Take as prescribed. - Continue nasal washes. Follow up visit:   2 Weeks Friday September 25th at 2:00 pm     Keep next scheduled appointment. Please give 24 hour notice if unable to keep appointment. [phone]    If you experience any of the following, please call the Wound Care Service during business hours: Monday through Friday 8:00 am - 4:30 pm  ([phone]).    *Increase in pain   *Temperature over 101   *Increase in drainage from your wound or a foul odor   *Uncontrolled swelling   *Need for compression bandage changes due to slippage, breakthrough drainage    If you need medical attention outside of business hours, please contact your Primary Care Doctor or go to the nearest emergency room.                    Electronically signed by TERRELL Lizama CNP on 9/2/2020 at 2:31 PM

## 2020-09-08 ENCOUNTER — TELEPHONE (OUTPATIENT)
Dept: ENT CLINIC | Age: 60
End: 2020-09-08

## 2020-09-08 NOTE — TELEPHONE ENCOUNTER
----- Message from Michael Sarmiento MD sent at 9/7/2020 10:54 PM EDT -----  Regarding: Appointment time for this patient  She has an appointment on 25 September to see infectious disease at 2:00 and then me at 3:00. That would be useless. I need to see her before the 2:00 appointment whether it be that day or the day before or sometime even the week before. She was only given 14 days of oral Cipro. Please check with the patient and see if she is getting better on the ciprofloxacin. If there has been no improvement I need to see her this week. If she is better, I could wait till the 25th, but I need to see her before infectious disease. If necessary, we could ask ID to move their appointment till after mine.

## 2020-09-11 ENCOUNTER — OFFICE VISIT (OUTPATIENT)
Dept: ENT CLINIC | Age: 60
End: 2020-09-11
Payer: COMMERCIAL

## 2020-09-11 VITALS
HEART RATE: 72 BPM | RESPIRATION RATE: 14 BRPM | SYSTOLIC BLOOD PRESSURE: 118 MMHG | HEIGHT: 64 IN | DIASTOLIC BLOOD PRESSURE: 70 MMHG | TEMPERATURE: 97.2 F | WEIGHT: 134.5 LBS | BODY MASS INDEX: 22.96 KG/M2

## 2020-09-11 PROCEDURE — G8427 DOCREV CUR MEDS BY ELIG CLIN: HCPCS | Performed by: OTOLARYNGOLOGY

## 2020-09-11 PROCEDURE — 1036F TOBACCO NON-USER: CPT | Performed by: OTOLARYNGOLOGY

## 2020-09-11 PROCEDURE — 3017F COLORECTAL CA SCREEN DOC REV: CPT | Performed by: OTOLARYNGOLOGY

## 2020-09-11 PROCEDURE — G8420 CALC BMI NORM PARAMETERS: HCPCS | Performed by: OTOLARYNGOLOGY

## 2020-09-11 PROCEDURE — 99213 OFFICE O/P EST LOW 20 MIN: CPT | Performed by: OTOLARYNGOLOGY

## 2020-09-11 ASSESSMENT — ENCOUNTER SYMPTOMS
COLOR CHANGE: 0
DIARRHEA: 0
SHORTNESS OF BREATH: 0
VOICE CHANGE: 0
STRIDOR: 0
RHINORRHEA: 0
SORE THROAT: 0
WHEEZING: 0
APNEA: 0
VOMITING: 0
COUGH: 0
ABDOMINAL PAIN: 0
CHOKING: 0
FACIAL SWELLING: 0
SINUS PRESSURE: 1
CHEST TIGHTNESS: 0
TROUBLE SWALLOWING: 0
NAUSEA: 0

## 2020-09-11 NOTE — LETTER
[facility]  [address] [facility]  Phone: [phone]  Fax: [phone]    Rendell Meckel, MD        September 21, 2020    Colin Escobedo, APRN - [facility]  [address]    Patient: Janneth Radford   MR Number: [id number]   YOB: 1960   Date of Visit: 9/11/2020     Dear Colin Escobedo,    I recently saw your patient, Janneth Radford, regarding her chronic sinusitis. Her sinuses are open and draining but they continue to drain because the infection is Serratia marcescens and she is allergic to Sulfatrim. I have infectious disease helping, but it would be very difficult for the patient to have intravenous antibiotics as an outpatient for an entire month. Below are the relevant portions of my assessment and plan of care. Assessment & Plan   Diagnoses and all orders for this visit:     Diagnosis Orders   1. Chronic frontal sinusitis  Culture, Nasal   2. Chronic maxillary sinusitis, left  Culture, Nasal   3. Chronic ethmoidal sinusitis  Culture, Nasal   4. Chronic sphenoidal sinusitis, left  Culture, Nasal   5. Purulent rhinorrhea  Culture, Nasal   6. Allergy to sulfa drugs         The findings were explained and her questions were answered. Culture is repeated. We need to have them see infectious disease regarding other options. Serratia takes extensive duration of antibiotic to clear and ciprofloxacin has very poor tissue levels, being very poorly absorbed p.o. There is no indication for p.o. ciprofloxacin in ENT. It is unfortunate she is so allergic to the Sulfatrim. She is instructed to continue her irrigations. .  Perhaps her own immune system will throw this off, but I suspect she will need intravenous antibiotics    It would be feasible to start intravenous antibiotics perioperatively with a general anesthetic and extensive irrigation and flushing of her sinuses with the [employer]tronic hydro-debrider. This instrument is specifically designed to powerflush sinuses at the time of surgery. If you have questions, please do not hesitate to call me. I look forward to following Joyce Mabry along with you.     Sincerely,          Jordin King MD

## 2020-09-11 NOTE — PROGRESS NOTES
 TONSILLECTOMY AND ADENOIDECTOMY      TUBAL LIGATION       Family History   Problem Relation Age of Onset    Heart Disease Mother     Diabetes Sister     Cancer Brother     Prostate Cancer Brother     Other Brother         Heart Valve Replace/Repair - birth defect    Heart Disease Brother     Heart Disease Brother      Social History     Tobacco Use    Smoking status: Never Smoker    Smokeless tobacco: Never Used   Substance Use Topics    Alcohol use: Not Currently     Comment: rare       Subjective:      Review of Systems   Constitutional: Negative for activity change, appetite change, chills, diaphoresis, fatigue, fever and unexpected weight change. HENT: Positive for congestion, postnasal drip and sinus pressure. Negative for dental problem, ear discharge, ear pain, facial swelling, hearing loss, mouth sores, nosebleeds, rhinorrhea, sneezing, sore throat, tinnitus, trouble swallowing and voice change. Eyes: Negative for visual disturbance. Respiratory: Negative for apnea, cough, choking, chest tightness, shortness of breath, wheezing and stridor. Cardiovascular: Negative for chest pain, palpitations and leg swelling. Gastrointestinal: Negative for abdominal pain, diarrhea, nausea and vomiting. Endocrine: Negative for cold intolerance, heat intolerance, polydipsia and polyuria. Genitourinary: Negative for dysuria, enuresis and hematuria. Musculoskeletal: Negative for arthralgias, gait problem, neck pain and neck stiffness. Skin: Negative for color change and rash. Allergic/Immunologic: Negative for environmental allergies, food allergies and immunocompromised state. Neurological: Negative for dizziness, syncope, facial asymmetry, speech difficulty, light-headedness and headaches. Hematological: Negative for adenopathy. Does not bruise/bleed easily. Psychiatric/Behavioral: Negative for confusion and sleep disturbance. The patient is not nervous/anxious.         Objective:

## 2020-09-13 LAB
AEROBIC CULTURE: NORMAL
GRAM STAIN RESULT: NORMAL

## 2020-09-17 ENCOUNTER — TELEPHONE (OUTPATIENT)
Dept: WOUND CARE | Age: 60
End: 2020-09-17

## 2020-09-21 ENCOUNTER — TELEPHONE (OUTPATIENT)
Dept: ENT CLINIC | Age: 60
End: 2020-09-21

## 2020-09-21 NOTE — TELEPHONE ENCOUNTER
----- Message from Elida Romero MD sent at 9/21/2020  2:51 PM EDT -----  Regarding: Patient status  Please check on the patient in terms of her irrigations and drainage and facial discomfort. See if she has any improvement or is worse etc. recent culture did not grow anything, which suggests we have only partially treated her Serratia. She has an appointment with infectious disease later this week. We need to see her back in a couple of weeks to see if their recommendations are helping.     Thank you

## 2020-09-22 NOTE — TELEPHONE ENCOUNTER
Patient returned our call. Asked how she was doing since seeing ID on 09/02/20. Patient states she has not had any improvement. She says she still has thick, yellow, nasty drainage. Placed patient on hold and spoke to Dr Javier Lauren in regards to patient. He wants to r/s her appointment out another 2 weeks after seeing ID again on 09/25/20. Informed patient and moved her appointment out to 10/09/20. Patient verbalized understanding and thanked me.

## 2020-09-25 ENCOUNTER — HOSPITAL ENCOUNTER (OUTPATIENT)
Dept: WOUND CARE | Age: 60
Discharge: HOME | End: 2020-09-25
Payer: COMMERCIAL

## 2020-09-25 VITALS
RESPIRATION RATE: 14 BRPM | TEMPERATURE: 97.8 F | SYSTOLIC BLOOD PRESSURE: 115 MMHG | OXYGEN SATURATION: 98 % | DIASTOLIC BLOOD PRESSURE: 66 MMHG | HEART RATE: 76 BPM

## 2020-09-25 PROCEDURE — 99211 OFF/OP EST MAY X REQ PHY/QHP: CPT

## 2020-09-25 PROCEDURE — 99212 OFFICE O/P EST SF 10 MIN: CPT | Performed by: NURSE PRACTITIONER

## 2020-09-25 NOTE — PROGRESS NOTES
[facility] Procedure Note      Janneth Radford  MEDICAL RECORD NUMBER:  [id number]  AGE: 62 y.o. GENDER: female  : 1960  EPISODE DATE:  2020    SUBJECTIVE:     Chief Complaint   Patient presents with    Other     sinus infection         HISTORY OF PRESENT ILLNESS     Hoang Sanchez is a 62 y.o. female who presents today for infectious disease evaluation related to ongoing purulent drainage from sinuses. Patient has been following with Dr. Nasir Celis for problem since 2019. Nish Muniz reports that she was on Augmentin for 2 months at initiation of problem. She reports a second antibiotic was given by pulmonologist but she is unsure of which antibiotic this was. Most recently she was on doxycycline for culture positive for Serratia marcescens,  prescribed by Dr. Nasir Celis. She reports no relief of symptoms with use of doxycycline. Has also been using saline nasal washes as recommended by Dr. Nasir Celis. Voices complaints of ongoing feelings of congestion and pressure to lateral portions of face and ears. States that drainage is not continuous but when it does drain is described as large amounts, thick and orange in color with what is described as fish egg appearing portions. Denies fever or chills, loss of appetite, weight loss, nausea, vomiting, diarrhea. Has chronic shortness of breath-followed by a pulmonologist for possible sarcoidosis. Nish Muniz travels daily for work, does report that she travelled to [facility] within the last year, voices concern for possible fungi/mold. She denies any smoking history. No further concerns verbalized.     Culture reports reviewed. 2020 culture showed moderate growth of Serratia marcescens. Repeated culture on 2020 as obtained from the frontal sinuses showed no growth. Repeat culture on 2020 obtained also showed no growth.   Nish Muniz was given 14 days of Cipro at last visit due to ongoing symptoms and prior culture showing serratia. This was stopped at day 10 by Dr. Julianna Aviles. Juanito Jaeger states that her pulmonologist at [facility] has discussed sending her to ENT specialist at the [facility] for tertiary evaluation. PAST MEDICAL HISTORY             Diagnosis Date    Chronic idiopathic pulmonary fibrosis ([address]        PAST SURGICAL HISTORY     Past Surgical History:   Procedure Laterality Date    CARPAL TUNNEL RELEASE Bilateral     COLONOSCOPY      SINUS ENDOSCOPY Bilateral 7/8/2020    IGS SINUS MAXILLARY ANTROSTOMY, FRONTAL SINUSOTOMY WITH REMOVAL OF TISSUE, ANTERIOR AND POSTERIOR ETHMOIDECTOMY, SPHENOIDECTOMY-ALL BILATERAL performed by Nishi Owen MD at [address] [facility] HISTORY     Family History   Problem Relation Age of Onset    Heart Disease Mother     Diabetes Sister     Cancer Brother     Prostate Cancer Brother     Other Brother         Heart Valve Replace/Repair - birth defect    Heart Disease Brother     Heart Disease Brother        SOCIAL HISTORY     Social History     Tobacco Use    Smoking status: Never Smoker    Smokeless tobacco: Never Used   Substance Use Topics    Alcohol use: Not Currently     Comment: rare    Drug use: Never       ALLERGIES     Allergies   Allergen Reactions    Bactrim [Sulfamethoxazole-Trimethoprim] Hives    Sulfa Antibiotics Hives       MEDICATIONS     Current Outpatient Medications on File Prior to Encounter   Medication Sig Dispense Refill    omeprazole (PRILOSEC) 20 MG delayed release capsule       estradiol (ESTRACE) 0.1 MG/GM vaginal cream INSERT 1 GRAM (1/4 APPLICATORFUL) VAGINALLY NIGHTLY AT BEDTIME 2 NIGHTS A WEEK.  betamethasone dipropionate 0.05 % lotion APPLY TOPICALLY TO THE AFFECTED AREA ONCE A DAY FOR TWO WEEKS.       Multiple Vitamins-Minerals (MULTI FOR HER) TABS Take 1 tablet by mouth daily      vitamin C (ASCORBIC ACID) 500 MG tablet Take 500 mg by mouth daily      calcium carbonate 600 MG TABS tablet Take 1 tablet by mouth daily      Red Yeast Rice 600 MG TABS Take 1 tablet by mouth daily       No current facility-administered medications on file prior to encounter. REVIEW OF SYSTEMS:     Constitutional: denies fever, chills, night sweats, weight loss/gain, loss of appetite   HEENT: + Sinus pressure, nasal drainage,  postnasal drainage denies  loss of consciousness  Respiratory: + cough, Denies shortness of breath  Cardiovascular:Denies chest pain, palpitations, edema  Gastrointestinal: Denies nausea, vomiting, constipation, diarrhea, abdominal pain   KIA: Denies dysuria, frequency, urgency, hematuria  Musculoskeletal: Denies joint pain, swelling , stiffness,  Endocrine: Denies polyuria, polydipsia, cold or heat intolerance  Hematology: Denies easy brusing or bleeding, hx of clotting disorder  Dermatology: Denies skin rash, eczema, pruritis    PHYSICAL EXAM:     /66   Pulse 76   Temp 97.8 °F (36.6 °C) (Tympanic)   Resp 14   SpO2 98%   Wt Readings from Last 3 Encounters:   09/11/20 134 lb 8 oz (61 kg)   08/28/20 133 lb (60.3 kg)   08/06/20 132 lb (59.9 kg)     General:  Awake, alert, no apparent distress. Appears stated age  [de-identified]: conjuctivae are clear without exudate or hemorrhage, anicteric sclera, moist oral mucosa. Chest:  Respirations regular, non-labored. Chest rise and fall equal bilaterally. Cardiovascular:  RRR,S1S2, no murmur or gallop. Neurological: Awake, alert, oriented x4  Psychiatric:  Appropriate mood and affect  Extremities: non-traumatic in appearance. Skin:  Warm and dry          LABS     9/13/2020  8:49 AM - Edyta Marc File Incoming Lab Results From [facility]     Specimen Information:  Sinus          Component  Collected  Lab    Aerobic Culture  09/11/2020  1:22 PM  [facility]    No growth-preliminary Normal jhonaa     Gram Stain Result  09/11/2020  1:22 PM  [facility]    Rare segmented neutrophils observed.  Rare epithelial cells observed. No organisms observed. 8/30/2020  7:28 AM - MonicoJayce wolf Incoming Lab Results From [facility]     Specimen Information:  Sinus          Component  Collected  Lab    Aerobic Culture  08/28/2020  2:35 PM  [facility]    No growth-preliminary Normal jhoana     Gram Stain Result  08/28/2020  2:35 PM  [facility]    Rare segmented neutrophils observed. Rare epithelial cells observed. No bacteria seen. ASSESSMENT     --Chronic sinusitis     Patient Active Problem List   Diagnosis Code    Chronic frontal sinusitis J32.1    Chronic ethmoidal sinusitis J32.2    Chronic maxillary sinusitis, left J32.0    Chronic sphenoidal sinusitis, left J32.3    Sarcoidosis of lung ([facility] [address] D86.0    Cough R05       PLAN     Patient examined and evaluated    -Chronic sinusitis- Patient has had 2 consecutive cultures at this time that have shown no growth, indicating resolution of previously identified Serratia Marcesens. No fever, chills or indications of systemic infection. No indications for antibiotics at this time. Patient voices hesitancy with antibiotic therapy, stating that she is not comfortable taking pills if we don't know what we are treating. Risk of resistance and side effects too high without a known pathogen.      -Continue management per ENT for possible irrigation/surgical intervention. Continue nasal washes as recommended by ENT. -Case as above discussed at length with Dr. Corey Cordova who is in agreement with plan of care and no further antibiotic treatment.      -Follow up as needed, call clinic with any needs or concerns. All questions and concerns addressed prior to discharge from today's visit. Please see attached Discharge Instructions    Written patient dismissal instructions given to patient and signed by patient or POA.            Discharge Instructions         Discharge Instructions       Visit Discharge/Physician Orders:  - no antibiotics needed at this time due to 2 negative cultures  - Continue nasal washes.      Follow up visit:   as needed       Keep next scheduled appointment. Please give 24 hour notice if unable to keep appointment. [phone]     If you experience any of the following, please call the Wound Care Service during business hours: Monday through Friday 8:00 am - 4:30 pm  ([phone]).               *Increase in pain              *Temperature over 101              *Increase in drainage from your wound or a foul odor              *Uncontrolled swelling              *Need for compression bandage changes due to slippage, breakthrough drainage     If you need medical attention outside of business hours, please contact your Primary Care Doctor or go to the nearest emergency room.            Electronically signed by TERRELL Timmons CNP on 9/25/2020 at 2:32 PM

## 2020-09-25 NOTE — PLAN OF CARE
Problem: Physical Regulation:  Goal: Will remain free from infection  Description: Will remain free from infection  Outcome: Ongoing  Goal: Complications related to the disease process, condition or treatment will be avoided or minimized  Description: Complications related to the disease process, condition or treatment will be avoided or minimized  Outcome: Ongoing   Pt. Seen today for sinus infection see AVS for orders. Follow up as needed. Care plan reviewed with patient. Patient verbalize understanding of the plan of care and contribute to goal setting.

## 2020-09-30 ENCOUNTER — TELEPHONE (OUTPATIENT)
Dept: ENT CLINIC | Age: 60
End: 2020-09-30

## 2020-10-02 PROBLEM — R05.9 COUGH: Status: RESOLVED | Noted: 2017-05-03 | Resolved: 2020-10-02

## 2021-02-19 ENCOUNTER — TELEPHONE (OUTPATIENT)
Dept: WOUND CARE | Age: 61
End: 2021-02-19

## 2023-11-28 ENCOUNTER — APPOINTMENT (OUTPATIENT)
Dept: PULMONOLOGY | Facility: HOSPITAL | Age: 63
End: 2023-11-28

## (undated) DEVICE — CONTAINER VACUTAINER ANAER CULTURE SWAB

## (undated) DEVICE — COVER ARMBRD W13XL28.5IN IMPERV BLU FOR OP RM

## (undated) DEVICE — SINU FOAM: Brand: SINU-FOAM

## (undated) DEVICE — CONTAINER,SPECIMEN,PNEU TUBE,4OZ,OR STRL: Brand: MEDLINE

## (undated) DEVICE — TUBING 1895522 5PK STRAIGHTSHOT TO XPS: Brand: STRAIGHTSHOT®

## (undated) DEVICE — GAUZE,SPONGE,4"X4",12PLY,STERILE,LF,2'S: Brand: MEDLINE

## (undated) DEVICE — Device

## (undated) DEVICE — BLADE 1884080EM TRICUT 4MMX13CM M4 ROHS: Brand: FUSION®

## (undated) DEVICE — TUBING, SUCTION, 1/4" X 20', STRAIGHT: Brand: MEDLINE INDUSTRIES, INC.

## (undated) DEVICE — GLOVE SURG SZ 65 THK91MIL LTX FREE SYN POLYISOPRENE

## (undated) DEVICE — GLOVE ORANGE PI 7   MSG9070

## (undated) DEVICE — GLOVE SURG 7.5 PF POLYMER WHT STRL SIGN LTX ESSENTIAL LTX

## (undated) DEVICE — TOTAL TRAY, DB, 100% SILI FOLEY, 16FR 10: Brand: MEDLINE

## (undated) DEVICE — TURBINATOR WAND: Brand: COBLATION

## (undated) DEVICE — INSTRUMENT TRACKER 9733533XOM ENT 1PK

## (undated) DEVICE — PATIENT TRACKER 9734887XOM NON-INVASIVE

## (undated) DEVICE — GLOVE ORANGE PI 7 1/2   MSG9075

## (undated) DEVICE — GOWN,SIRUS,NON REINFRCD,LARGE,SET IN SL: Brand: MEDLINE

## (undated) DEVICE — ENT PACK: Brand: MEDLINE INDUSTRIES, INC.